# Patient Record
Sex: FEMALE | Race: WHITE | HISPANIC OR LATINO | URBAN - METROPOLITAN AREA
[De-identification: names, ages, dates, MRNs, and addresses within clinical notes are randomized per-mention and may not be internally consistent; named-entity substitution may affect disease eponyms.]

---

## 2017-06-11 ENCOUNTER — EMERGENCY (EMERGENCY)
Facility: HOSPITAL | Age: 62
LOS: 0 days | Discharge: HOME | End: 2017-06-11

## 2017-06-11 DIAGNOSIS — S52.209A UNSPECIFIED FRACTURE OF SHAFT OF UNSPECIFIED ULNA, INITIAL ENCOUNTER FOR CLOSED FRACTURE: ICD-10-CM

## 2017-06-28 DIAGNOSIS — F33.9 MAJOR DEPRESSIVE DISORDER, RECURRENT, UNSPECIFIED: ICD-10-CM

## 2017-06-28 DIAGNOSIS — F32.9 MAJOR DEPRESSIVE DISORDER, SINGLE EPISODE, UNSPECIFIED: ICD-10-CM

## 2017-06-28 DIAGNOSIS — E78.00 PURE HYPERCHOLESTEROLEMIA, UNSPECIFIED: ICD-10-CM

## 2017-06-28 DIAGNOSIS — Z79.899 OTHER LONG TERM (CURRENT) DRUG THERAPY: ICD-10-CM

## 2017-06-28 DIAGNOSIS — I10 ESSENTIAL (PRIMARY) HYPERTENSION: ICD-10-CM

## 2017-06-28 DIAGNOSIS — Z79.84 LONG TERM (CURRENT) USE OF ORAL HYPOGLYCEMIC DRUGS: ICD-10-CM

## 2017-06-28 DIAGNOSIS — Z88.1 ALLERGY STATUS TO OTHER ANTIBIOTIC AGENTS STATUS: ICD-10-CM

## 2017-06-28 DIAGNOSIS — Z87.891 PERSONAL HISTORY OF NICOTINE DEPENDENCE: ICD-10-CM

## 2017-07-03 ENCOUNTER — EMERGENCY (EMERGENCY)
Facility: HOSPITAL | Age: 62
LOS: 1 days | Discharge: HOME | End: 2017-07-03
Attending: EMERGENCY MEDICINE | Admitting: EMERGENCY MEDICINE
Payer: COMMERCIAL

## 2017-07-03 VITALS
OXYGEN SATURATION: 96 % | TEMPERATURE: 98 F | RESPIRATION RATE: 18 BRPM | HEART RATE: 95 BPM | SYSTOLIC BLOOD PRESSURE: 104 MMHG | DIASTOLIC BLOOD PRESSURE: 71 MMHG

## 2017-07-03 VITALS
TEMPERATURE: 99 F | RESPIRATION RATE: 18 BRPM | HEART RATE: 120 BPM | DIASTOLIC BLOOD PRESSURE: 90 MMHG | OXYGEN SATURATION: 98 % | SYSTOLIC BLOOD PRESSURE: 176 MMHG

## 2017-07-03 DIAGNOSIS — F32.9 MAJOR DEPRESSIVE DISORDER, SINGLE EPISODE, UNSPECIFIED: ICD-10-CM

## 2017-07-03 DIAGNOSIS — F40.01 AGORAPHOBIA WITH PANIC DISORDER: ICD-10-CM

## 2017-07-03 DIAGNOSIS — F17.210 NICOTINE DEPENDENCE, CIGARETTES, UNCOMPLICATED: ICD-10-CM

## 2017-07-03 DIAGNOSIS — Z79.899 OTHER LONG TERM (CURRENT) DRUG THERAPY: ICD-10-CM

## 2017-07-03 DIAGNOSIS — J32.9 CHRONIC SINUSITIS, UNSPECIFIED: ICD-10-CM

## 2017-07-03 LAB
ALBUMIN SERPL ELPH-MCNC: 3.9 G/DL — SIGNIFICANT CHANGE UP (ref 3.3–5)
ALP SERPL-CCNC: 166 U/L — HIGH (ref 40–120)
ALT FLD-CCNC: 33 U/L — SIGNIFICANT CHANGE UP (ref 10–45)
ANION GAP SERPL CALC-SCNC: 15 MMOL/L — SIGNIFICANT CHANGE UP (ref 5–17)
APPEARANCE UR: CLEAR — SIGNIFICANT CHANGE UP
AST SERPL-CCNC: 30 U/L — SIGNIFICANT CHANGE UP (ref 10–40)
BASOPHILS NFR BLD AUTO: 0.4 % — SIGNIFICANT CHANGE UP (ref 0–2)
BILIRUB SERPL-MCNC: 0.7 MG/DL — SIGNIFICANT CHANGE UP (ref 0.2–1.2)
BILIRUB UR-MCNC: NEGATIVE — SIGNIFICANT CHANGE UP
BUN SERPL-MCNC: 12 MG/DL — SIGNIFICANT CHANGE UP (ref 7–23)
CALCIUM SERPL-MCNC: 9.4 MG/DL — SIGNIFICANT CHANGE UP (ref 8.4–10.5)
CHLORIDE SERPL-SCNC: 102 MMOL/L — SIGNIFICANT CHANGE UP (ref 96–108)
CO2 SERPL-SCNC: 22 MMOL/L — SIGNIFICANT CHANGE UP (ref 22–31)
COLOR SPEC: YELLOW — SIGNIFICANT CHANGE UP
CREAT SERPL-MCNC: 0.7 MG/DL — SIGNIFICANT CHANGE UP (ref 0.5–1.3)
DIFF PNL FLD: NEGATIVE — SIGNIFICANT CHANGE UP
EOSINOPHIL NFR BLD AUTO: 1.3 % — SIGNIFICANT CHANGE UP (ref 0–6)
GLUCOSE SERPL-MCNC: 255 MG/DL — HIGH (ref 70–99)
GLUCOSE UR QL: 500
HCT VFR BLD CALC: 46.9 % — HIGH (ref 34.5–45)
HGB BLD-MCNC: 16.9 G/DL — HIGH (ref 11.5–15.5)
KETONES UR-MCNC: NEGATIVE — SIGNIFICANT CHANGE UP
LEUKOCYTE ESTERASE UR-ACNC: NEGATIVE — SIGNIFICANT CHANGE UP
LYMPHOCYTES # BLD AUTO: 18.1 % — SIGNIFICANT CHANGE UP (ref 13–44)
MCHC RBC-ENTMCNC: 33.5 PG — SIGNIFICANT CHANGE UP (ref 27–34)
MCHC RBC-ENTMCNC: 36 G/DL — SIGNIFICANT CHANGE UP (ref 32–36)
MCV RBC AUTO: 92.9 FL — SIGNIFICANT CHANGE UP (ref 80–100)
MONOCYTES NFR BLD AUTO: 4.6 % — SIGNIFICANT CHANGE UP (ref 2–14)
NEUTROPHILS NFR BLD AUTO: 75.6 % — SIGNIFICANT CHANGE UP (ref 43–77)
NITRITE UR-MCNC: NEGATIVE — SIGNIFICANT CHANGE UP
PH UR: 6 — SIGNIFICANT CHANGE UP (ref 5–8)
PLATELET # BLD AUTO: 155 K/UL — SIGNIFICANT CHANGE UP (ref 150–400)
POTASSIUM SERPL-MCNC: 4.1 MMOL/L — SIGNIFICANT CHANGE UP (ref 3.5–5.3)
POTASSIUM SERPL-SCNC: 4.1 MMOL/L — SIGNIFICANT CHANGE UP (ref 3.5–5.3)
PROT SERPL-MCNC: 7.1 G/DL — SIGNIFICANT CHANGE UP (ref 6–8.3)
PROT UR-MCNC: NEGATIVE MG/DL — SIGNIFICANT CHANGE UP
RBC # BLD: 5.05 M/UL — SIGNIFICANT CHANGE UP (ref 3.8–5.2)
RBC # FLD: 13.8 % — SIGNIFICANT CHANGE UP (ref 10.3–16.9)
SODIUM SERPL-SCNC: 139 MMOL/L — SIGNIFICANT CHANGE UP (ref 135–145)
SP GR SPEC: 1.02 — SIGNIFICANT CHANGE UP (ref 1–1.03)
T4 AB SER-ACNC: 7.92 UG/DL — SIGNIFICANT CHANGE UP (ref 3.17–11.72)
TSH SERPL-MCNC: 0.33 UIU/ML — LOW (ref 0.35–4.94)
UROBILINOGEN FLD QL: 1 E.U./DL — SIGNIFICANT CHANGE UP
WBC # BLD: 17.1 K/UL — HIGH (ref 3.8–10.5)
WBC # FLD AUTO: 17.1 K/UL — HIGH (ref 3.8–10.5)

## 2017-07-03 PROCEDURE — 71046 X-RAY EXAM CHEST 2 VIEWS: CPT

## 2017-07-03 PROCEDURE — 99284 EMERGENCY DEPT VISIT MOD MDM: CPT | Mod: 25

## 2017-07-03 PROCEDURE — 84443 ASSAY THYROID STIM HORMONE: CPT

## 2017-07-03 PROCEDURE — 71020: CPT | Mod: 26

## 2017-07-03 PROCEDURE — 87086 URINE CULTURE/COLONY COUNT: CPT

## 2017-07-03 PROCEDURE — 90792 PSYCH DIAG EVAL W/MED SRVCS: CPT

## 2017-07-03 PROCEDURE — 85025 COMPLETE CBC W/AUTO DIFF WBC: CPT

## 2017-07-03 PROCEDURE — 87186 SC STD MICRODIL/AGAR DIL: CPT

## 2017-07-03 PROCEDURE — 81003 URINALYSIS AUTO W/O SCOPE: CPT

## 2017-07-03 PROCEDURE — 93005 ELECTROCARDIOGRAM TRACING: CPT

## 2017-07-03 PROCEDURE — 93010 ELECTROCARDIOGRAM REPORT: CPT | Mod: NC

## 2017-07-03 PROCEDURE — 84480 ASSAY TRIIODOTHYRONINE (T3): CPT

## 2017-07-03 PROCEDURE — 84436 ASSAY OF TOTAL THYROXINE: CPT

## 2017-07-03 PROCEDURE — 80053 COMPREHEN METABOLIC PANEL: CPT

## 2017-07-03 RX ORDER — VENLAFAXINE HCL 75 MG
1 CAPSULE, EXT RELEASE 24 HR ORAL
Qty: 15 | Refills: 0
Start: 2017-07-03 | End: 2017-07-18

## 2017-07-03 RX ORDER — CLONAZEPAM 1 MG
1 TABLET ORAL
Qty: 30 | Refills: 0
Start: 2017-07-03 | End: 2017-07-18

## 2017-07-03 RX ORDER — AMOXICILLIN 250 MG/5ML
1 SUSPENSION, RECONSTITUTED, ORAL (ML) ORAL
Qty: 20 | Refills: 0
Start: 2017-07-03 | End: 2017-07-13

## 2017-07-03 NOTE — ED BEHAVIORAL HEALTH ASSESSMENT NOTE - HPI (INCLUDE ILLNESS QUALITY, SEVERITY, DURATION, TIMING, CONTEXT, MODIFYING FACTORS, ASSOCIATED SIGNS AND SYMPTOMS)
62 year old female with long history of anxiety with panic attacks, resultant depression due to the anxiety, prior successful rx with SSRI's, now walk in with unremitting anxiety despite taking prescribed lorazepam 1 mg po qid prn.   Pt acknowledges noncompliance with current rx (Cymbalta, then Paxil), mainly because she is anxious that she will have SE as she's had with prior medications (burning sensation, itching, hair loss).  Pt reports she is approaching the end of TMS treatment. She already has had 31 sessions but will not go for the remaining 3 sessions. She states that her sx have worsened since the TMS.  Pt endorsing panic attacks, tearfulness, staying in bed most of the day because she is afraid to go out, +/- concentration difficulties, decreased jorge with ~5# weight loss. She sleeps well most nights (usually 6 hours) but wakes up with tremendous anxiety.   Pt denies suicidal ideation/intent/plan now or ever, states she is very Islam and this has never crossed her mind.

## 2017-07-03 NOTE — ED BEHAVIORAL HEALTH ASSESSMENT NOTE - DESCRIPTION
Crying, anxious but redirectable and cooperative. Type II DM, HTN, cervical dystonia, GERD, lower back and cervical neck pain. Graduated from [employer]. ; 2 adult sons. Worked
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
## 2017-07-03 NOTE — ED PROVIDER NOTE - OBJECTIVE STATEMENT
62 F with hx of anxiety depression- co severe worsening depression- she is rx'ed cymbalta- not taking 2' to concern for SE- no si/hi  moderate- severe depression- very tearful  no exac factors  no allev- not improved with 31 tms rx's  has outpx therapist in [place]

## 2017-07-03 NOTE — ED ADULT NURSE NOTE - CHPI ED SYMPTOMS NEG
no paranoia/no weakness/no homicidal/no hallucinations/no suicidal/no weight loss/no confusion/no change in level of consciousness/no disorientation/no agitation

## 2017-07-03 NOTE — ED BEHAVIORAL HEALTH ASSESSMENT NOTE - OTHER PAST PSYCHIATRIC HISTORY (INCLUDE DETAILS REGARDING ONSET, COURSE OF ILLNESS, INPATIENT/OUTPATIENT TREATMENT)
As above.  No prior inpt admits.  No hx of suicidal ideation/intent/plan.    OUTPT:  Prior: Used to go to [facility] outpt psychiatric clinic ([facility]). Was rx'ed with Lexapro and Celexa with + effect. Self-d/c'ed medication when she began drinking because she was aware that there would be interactions between the alcohol and SSRI's. As above.  No prior inpt admits.  No hx of suicidal ideation/intent/plan.    OUTPT:  Prior: Used to go to [facility] ([facility] Was rx'ed with Lexapro and Celexa with + effect. Self-d/c'ed medication when she began drinking because she was aware that there would be interactions between the alcohol and SSRI's.    Current outpt tx/rx:  Sees Dr. Power ([phone]) ~q 2weeks. Next appt 7/6. Has been rx'ed with Lexapro, Cymbalta, now Paxil. Self-d/c'ed all antidepressant medications due to either SE or fear of SE. Currently, Dr. Power is only prescribing lorazepam for her.  Pt also started seeing outpt psychotherapist for CBT ("Joanna"), has only seen her 3x so far. Next appt is 7/7.

## 2017-07-03 NOTE — ED ADULT TRIAGE NOTE - CHIEF COMPLAINT QUOTE
pt c/o severe anxiety & depression. pt states " I had TSI therapy & it made me worse. They prescribed me medication but I'm too scared to take the medications because of bad side effects." pt is anxious & crying in triage. denies SI, HI.

## 2017-07-03 NOTE — ED PROVIDER NOTE - CARE PLAN
Principal Discharge DX:	Depression, unspecified depression type  Secondary Diagnosis:	Chronic sinusitis, unspecified location

## 2017-07-03 NOTE — ED BEHAVIORAL HEALTH ASSESSMENT NOTE - SUMMARY
62 year old female with hx of panic disorder, depression, noncompliant with antidepressant rx (for both panic disorder and depression) due to perceived SE. Pt with no suicidal ideation/intent/plan, thus no need for inpt admission.  Pt has never been on venlafaxine which may help both panic disorder and depression.  Pt has never been on clonazepam which may better address anxiety.

## 2017-07-03 NOTE — ED BEHAVIORAL HEALTH ASSESSMENT NOTE - RISK ASSESSMENT
Pt denies any history of suicidality, also denies any current SI/intent/plan. Is [employer]. Is future oriented. Lives with her family. Low risk for self harm at present time.

## 2017-07-03 NOTE — ED PROVIDER NOTE - MEDICAL DECISION MAKING DETAILS
seen by psych- rec med changes to klonopin nad effexor- px endorses frontal sinus pressure for weeks- req abx for sinus's- will adjust meds- fu with pcp/psych in 1 week

## 2017-07-03 NOTE — ED ADULT NURSE NOTE - OBJECTIVE STATEMENT
Pt presents to the ER tearful c/o anxiety and depression x1 year when she stopped drinking. Pt stated her father and sister "are dying from alcohol so I stopped." Pt stated she has tried many different anti depressants and have experienced "awful side effects. My arms were burning, my stomach, my whole body was itchy, I had tremors on the inside. One of them made me hyper." Pt currently takes lorazepam with minimal relief. Pt denies SI, denies HI. Pt reports she doesn't want to get out of bed and doesn't interact with her children, only over the phone. Will monitor and maintain safety.

## 2017-07-03 NOTE — ED BEHAVIORAL HEALTH ASSESSMENT NOTE - MEDICATIONS (PRESCRIPTIONS, DIRECTIONS)
Replace lorazepam with longer-acting clonazepam, to reduce periods of withdrawal and provide more smooth anxiolysis. Trial of venlafaxine SR 37.5 mg po qdaily, to address both anxiety and panic attacks as well as depressed mood.

## 2017-07-03 NOTE — ED BEHAVIORAL HEALTH ASSESSMENT NOTE - SUICIDE PROTECTIVE FACTORS
Responsibility to family and others/High spirituality/Identifies reasons for living/Future oriented/Supportive social network or family

## 2017-07-04 LAB — T3 SERPL-MCNC: 170 NG/DL — SIGNIFICANT CHANGE UP (ref 80–200)

## 2017-07-05 ENCOUNTER — EMERGENCY (EMERGENCY)
Facility: HOSPITAL | Age: 62
LOS: 1 days | Discharge: HOME | End: 2017-07-05
Attending: EMERGENCY MEDICINE | Admitting: EMERGENCY MEDICINE
Payer: COMMERCIAL

## 2017-07-05 VITALS
OXYGEN SATURATION: 100 % | RESPIRATION RATE: 18 BRPM | SYSTOLIC BLOOD PRESSURE: 115 MMHG | HEART RATE: 92 BPM | DIASTOLIC BLOOD PRESSURE: 80 MMHG | TEMPERATURE: 98 F

## 2017-07-05 VITALS
RESPIRATION RATE: 20 BRPM | SYSTOLIC BLOOD PRESSURE: 159 MMHG | HEART RATE: 100 BPM | DIASTOLIC BLOOD PRESSURE: 96 MMHG | OXYGEN SATURATION: 95 % | WEIGHT: 164.02 LBS | TEMPERATURE: 99 F

## 2017-07-05 DIAGNOSIS — F41.9 ANXIETY DISORDER, UNSPECIFIED: ICD-10-CM

## 2017-07-05 DIAGNOSIS — F17.210 NICOTINE DEPENDENCE, CIGARETTES, UNCOMPLICATED: ICD-10-CM

## 2017-07-05 DIAGNOSIS — Z79.2 LONG TERM (CURRENT) USE OF ANTIBIOTICS: ICD-10-CM

## 2017-07-05 DIAGNOSIS — F41.1 GENERALIZED ANXIETY DISORDER: ICD-10-CM

## 2017-07-05 DIAGNOSIS — Z79.899 OTHER LONG TERM (CURRENT) DRUG THERAPY: ICD-10-CM

## 2017-07-05 LAB
-  AMPICILLIN/SULBACTAM: SIGNIFICANT CHANGE UP
-  AMPICILLIN: SIGNIFICANT CHANGE UP
-  CEFAZOLIN: SIGNIFICANT CHANGE UP
-  CEFTRIAXONE: SIGNIFICANT CHANGE UP
-  CIPROFLOXACIN: SIGNIFICANT CHANGE UP
-  GENTAMICIN: SIGNIFICANT CHANGE UP
-  NITROFURANTOIN: SIGNIFICANT CHANGE UP
-  PIPERACILLIN/TAZOBACTAM: SIGNIFICANT CHANGE UP
-  TOBRAMYCIN: SIGNIFICANT CHANGE UP
-  TRIMETHOPRIM/SULFAMETHOXAZOLE: SIGNIFICANT CHANGE UP
CULTURE RESULTS: SIGNIFICANT CHANGE UP
METHOD TYPE: SIGNIFICANT CHANGE UP
ORGANISM # SPEC MICROSCOPIC CNT: SIGNIFICANT CHANGE UP
ORGANISM # SPEC MICROSCOPIC CNT: SIGNIFICANT CHANGE UP
SPECIMEN SOURCE: SIGNIFICANT CHANGE UP

## 2017-07-05 PROCEDURE — 90792 PSYCH DIAG EVAL W/MED SRVCS: CPT

## 2017-07-05 PROCEDURE — 99284 EMERGENCY DEPT VISIT MOD MDM: CPT | Mod: 25

## 2017-07-05 PROCEDURE — 99284 EMERGENCY DEPT VISIT MOD MDM: CPT

## 2017-07-05 NOTE — ED PROVIDER NOTE - OBJECTIVE STATEMENT
Pt presents to ER b/c she was Rxd Effexor 2d ago for her depression/anxiety/panic attacks, however she is "too anxious" to take the medication alone at home b/c of potential side effects. Pt requesting a higher level of care / higher level of observation. No SI or HI. Lives at home w/ who is w/pt in the ED now.

## 2017-07-05 NOTE — ED BEHAVIORAL HEALTH ASSESSMENT NOTE - SUMMARY
61 y/o woman with what appears to have been ANGELINA untreated over the last year.   pt denies any risk to self or others, suitable for out pt treatment   contacts re [facility] in [place] give to pt

## 2017-07-05 NOTE — ED PROVIDER NOTE - PHYSICAL EXAMINATION
General: anxious, tearful woman in no apparent distress  Neuro: normal speech and gait  Ext: clubbing of fingers  Psych: tearful, AOx3, no SI or HI

## 2017-07-05 NOTE — ED PROVIDER NOTE - PROGRESS NOTE DETAILS
Pt came to me after receiving the list of resources from Psychiatry and told me that she would follow-up as directed and that she had to leave and would not be waiting for any discharge paperwork.

## 2017-07-05 NOTE — ED BEHAVIORAL HEALTH ASSESSMENT NOTE - DESCRIPTION
uneventful Type II DM, HTN, cervical dystonia, GERD, lower back and cervical neck pain. Graduated from [employer]. ; 2 adult sons. Worked

## 2017-07-05 NOTE — ED PROVIDER NOTE - MEDICAL DECISION MAKING DETAILS
pt given out-pt options by psychiatry in the ED per pt request pt given out-pt options by psychiatry in the ED per pt request above

## 2017-07-05 NOTE — ED BEHAVIORAL HEALTH ASSESSMENT NOTE - DESCRIPTION (FIRST USE, LAST USE, QUANTITY, FREQUENCY, DURATION)
Smokes 1 ppd Drank episodically x 3-4 years, daily for a year. Stopped ~1 year ago Only takes prescribed lorazepam.

## 2017-07-05 NOTE — ED PROVIDER NOTE - NOTES
Pt seen and evaluated in ED and given resources close to her home in [place] per pt request and pt agrees to follow-up as directed and return to ER for any other concerns.
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
## 2017-07-05 NOTE — ED ADULT NURSE NOTE - CHPI ED SYMPTOMS NEG
no homicidal/no paranoia/no agitation/no change in level of consciousness/no hallucinations/no suicidal/no weakness/no disorientation/no confusion/no weight loss

## 2017-07-05 NOTE — ED BEHAVIORAL HEALTH ASSESSMENT NOTE - OTHER PAST PSYCHIATRIC HISTORY (INCLUDE DETAILS REGARDING ONSET, COURSE OF ILLNESS, INPATIENT/OUTPATIENT TREATMENT)
As above.  No prior inpt admits.  No hx of suicidal ideation/intent/plan.    OUTPT:  Prior: Used to go to [facility]pt psychiatric clinic ([facility] Was rx'ed with Lexapro and Celexa with + effect. Self-d/c'ed medication when she began drinking because she was aware that there would be interactions between the alcohol and SSRI's.    Current outpt tx/rx:  Sees Dr. Power ([phone]) ~q 2weeks. Next appt 7/6. Has been rx'ed with Lexapro, Cymbalta, now Paxil. Self-d/c'ed all antidepressant medications due to either SE or fear of SE. Currently, Dr. Power is only prescribing lorazepam for her.  Pt also started seeing outpt psychotherapist for CBT ("Joanna"), has only seen her 3x so far. Next appt is 7/7.

## 2017-07-05 NOTE — ED ADULT NURSE NOTE - OBJECTIVE STATEMENT
63 y/o female c/o anxiety. pt sees psychiatrist outpatient who wants to put her on effexor but she reports she is scared she will have 63 y/o female c/o anxiety. pt sees psychiatrist outpatient who wants to put her on effexor but she reports she is scared that she will have a reaction to the medication. denies si/hi. 61 y/o female c/o anxiety. pt sees psychiatrist outpatient who wants to put her on effexor but she reports she is scared that she will have a reaction to the medication. pt tearful and reports feeling very depressed and cannot get up out of bed, however denies si/hi.
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
## 2017-07-05 NOTE — ED PROVIDER NOTE - CHPI ED SYMPTOMS NEG
no disorientation/no confusion/no homicidal/no change in level of consciousness/no suicidal/no hallucinations

## 2017-07-05 NOTE — ED BEHAVIORAL HEALTH ASSESSMENT NOTE - SUICIDE PROTECTIVE FACTORS
Responsibility to family and others/Future oriented/Identifies reasons for living/High spirituality/Supportive social network or family

## 2017-10-27 NOTE — ED ADULT TRIAGE NOTE - ARRIVAL INFO ADDITIONAL COMMENTS
Form is in the folder.   Denies wanting to hurt self or others, auditory or visual hallucinations, use of ETOH.

## 2018-10-22 ENCOUNTER — INPATIENT (INPATIENT)
Facility: HOSPITAL | Age: 63
LOS: 0 days | Discharge: HOME | End: 2018-10-23
Attending: INTERNAL MEDICINE | Admitting: INTERNAL MEDICINE

## 2018-10-22 VITALS
TEMPERATURE: 98 F | HEART RATE: 107 BPM | DIASTOLIC BLOOD PRESSURE: 91 MMHG | SYSTOLIC BLOOD PRESSURE: 196 MMHG | RESPIRATION RATE: 18 BRPM | OXYGEN SATURATION: 99 %

## 2018-10-22 LAB
ALBUMIN SERPL ELPH-MCNC: 3.7 G/DL — SIGNIFICANT CHANGE UP (ref 3.5–5.2)
ALP SERPL-CCNC: 214 U/L — HIGH (ref 30–115)
ALT FLD-CCNC: 44 U/L — HIGH (ref 0–41)
ANION GAP SERPL CALC-SCNC: 18 MMOL/L — HIGH (ref 7–14)
APPEARANCE UR: ABNORMAL
AST SERPL-CCNC: 60 U/L — HIGH (ref 0–41)
BASOPHILS # BLD AUTO: 0.07 K/UL — SIGNIFICANT CHANGE UP (ref 0–0.2)
BASOPHILS NFR BLD AUTO: 0.6 % — SIGNIFICANT CHANGE UP (ref 0–1)
BILIRUB SERPL-MCNC: 2.1 MG/DL — HIGH (ref 0.2–1.2)
BILIRUB UR-MCNC: NEGATIVE — SIGNIFICANT CHANGE UP
BUN SERPL-MCNC: 8 MG/DL — LOW (ref 10–20)
CALCIUM SERPL-MCNC: 9.2 MG/DL — SIGNIFICANT CHANGE UP (ref 8.5–10.1)
CHLORIDE SERPL-SCNC: 96 MMOL/L — LOW (ref 98–110)
CO2 SERPL-SCNC: 26 MMOL/L — SIGNIFICANT CHANGE UP (ref 17–32)
COLOR SPEC: YELLOW — SIGNIFICANT CHANGE UP
CREAT SERPL-MCNC: 0.6 MG/DL — LOW (ref 0.7–1.5)
DIFF PNL FLD: NEGATIVE — SIGNIFICANT CHANGE UP
EOSINOPHIL # BLD AUTO: 0.38 K/UL — SIGNIFICANT CHANGE UP (ref 0–0.7)
EOSINOPHIL NFR BLD AUTO: 3.3 % — SIGNIFICANT CHANGE UP (ref 0–8)
EPI CELLS # UR: ABNORMAL /HPF
GLUCOSE SERPL-MCNC: 133 MG/DL — HIGH (ref 70–99)
GLUCOSE UR QL: NEGATIVE MG/DL — SIGNIFICANT CHANGE UP
HCT VFR BLD CALC: 45.3 % — SIGNIFICANT CHANGE UP (ref 37–47)
HGB BLD-MCNC: 15.6 G/DL — SIGNIFICANT CHANGE UP (ref 12–16)
IMM GRANULOCYTES NFR BLD AUTO: 0.3 % — SIGNIFICANT CHANGE UP (ref 0.1–0.3)
KETONES UR-MCNC: NEGATIVE — SIGNIFICANT CHANGE UP
LEUKOCYTE ESTERASE UR-ACNC: NEGATIVE — SIGNIFICANT CHANGE UP
LIDOCAIN IGE QN: 41 U/L — SIGNIFICANT CHANGE UP (ref 7–60)
LYMPHOCYTES # BLD AUTO: 25.8 % — SIGNIFICANT CHANGE UP (ref 20.5–51.1)
LYMPHOCYTES # BLD AUTO: 3 K/UL — SIGNIFICANT CHANGE UP (ref 1.2–3.4)
MCHC RBC-ENTMCNC: 32.8 PG — HIGH (ref 27–31)
MCHC RBC-ENTMCNC: 34.4 G/DL — SIGNIFICANT CHANGE UP (ref 32–37)
MCV RBC AUTO: 95.2 FL — SIGNIFICANT CHANGE UP (ref 81–99)
MONOCYTES # BLD AUTO: 0.77 K/UL — HIGH (ref 0.1–0.6)
MONOCYTES NFR BLD AUTO: 6.6 % — SIGNIFICANT CHANGE UP (ref 1.7–9.3)
NEUTROPHILS # BLD AUTO: 7.37 K/UL — HIGH (ref 1.4–6.5)
NEUTROPHILS NFR BLD AUTO: 63.4 % — SIGNIFICANT CHANGE UP (ref 42.2–75.2)
NITRITE UR-MCNC: NEGATIVE — SIGNIFICANT CHANGE UP
NRBC # BLD: 0 /100 WBCS — SIGNIFICANT CHANGE UP (ref 0–0)
PH UR: 7.5 — SIGNIFICANT CHANGE UP (ref 5–8)
PLATELET # BLD AUTO: 97 K/UL — LOW (ref 130–400)
POTASSIUM SERPL-MCNC: 3.3 MMOL/L — LOW (ref 3.5–5)
POTASSIUM SERPL-SCNC: 3.3 MMOL/L — LOW (ref 3.5–5)
PROT SERPL-MCNC: 7.2 G/DL — SIGNIFICANT CHANGE UP (ref 6–8)
PROT UR-MCNC: NEGATIVE MG/DL — SIGNIFICANT CHANGE UP
RBC # BLD: 4.76 M/UL — SIGNIFICANT CHANGE UP (ref 4.2–5.4)
RBC # FLD: 14.4 % — SIGNIFICANT CHANGE UP (ref 11.5–14.5)
SODIUM SERPL-SCNC: 140 MMOL/L — SIGNIFICANT CHANGE UP (ref 135–146)
SP GR SPEC: 1.01 — SIGNIFICANT CHANGE UP (ref 1.01–1.03)
TROPONIN T SERPL-MCNC: <0.01 NG/ML — SIGNIFICANT CHANGE UP
UROBILINOGEN FLD QL: 2 MG/DL (ref 0.2–0.2)
WBC # BLD: 11.63 K/UL — HIGH (ref 4.8–10.8)
WBC # FLD AUTO: 11.63 K/UL — HIGH (ref 4.8–10.8)

## 2018-10-22 RX ORDER — SODIUM CHLORIDE 9 MG/ML
1000 INJECTION, SOLUTION INTRAVENOUS ONCE
Qty: 0 | Refills: 0 | Status: COMPLETED | OUTPATIENT
Start: 2018-10-22 | End: 2018-10-22

## 2018-10-22 RX ORDER — FAMOTIDINE 10 MG/ML
20 INJECTION INTRAVENOUS ONCE
Qty: 0 | Refills: 0 | Status: COMPLETED | OUTPATIENT
Start: 2018-10-22 | End: 2018-10-22

## 2018-10-22 RX ADMIN — FAMOTIDINE 20 MILLIGRAM(S): 10 INJECTION INTRAVENOUS at 20:22

## 2018-10-22 RX ADMIN — SODIUM CHLORIDE 1000 MILLILITER(S): 9 INJECTION, SOLUTION INTRAVENOUS at 21:22

## 2018-10-22 RX ADMIN — SODIUM CHLORIDE 1000 MILLILITER(S): 9 INJECTION, SOLUTION INTRAVENOUS at 20:22

## 2018-10-22 RX ADMIN — Medication 30 MILLILITER(S): at 20:22

## 2018-10-22 NOTE — ED PROVIDER NOTE - CARE PLAN
Assessment and plan of treatment:	r/o rib fx, ptx, r/o intraabdo pathology- cholycystitis/choledocolithiasis, pancreatitis, possible gatritis/gerd, assess for uti, renal mass/hematoma  belly labs, ct chest, ctap, fluids, gi cocktail, ed observation Principal Discharge DX:	Closed fracture of one rib of left side, initial encounter  Assessment and plan of treatment:	r/o rib fx, ptx, r/o intraabdo pathology- cholycystitis/choledocolithiasis, pancreatitis, possible gatritis/gerd, assess for uti, renal mass/hematoma  belly labs, ct chest, ctap, fluids, gi cocktail, ed observation  Secondary Diagnosis:	Gall stones  Secondary Diagnosis:	Elevated LFTs

## 2018-10-22 NOTE — ED PROVIDER NOTE - MEDICAL DECISION MAKING DETAILS
seen by surgery in ER. No tenderness on their exam but given elevated LFTs and sono findings recommend med admit with GI eval, Sugery to remain on consult and pt to be monitored

## 2018-10-22 NOTE — ED PROVIDER NOTE - PHYSICAL EXAMINATION
PHYSICAL EXAM:    Constitutional: awake, alert, NAD  Eyes: EOMI, no conj injection  HENT: NC AT  Back: no c/t/l spine ttp  Chest: minimal left lower rib ttp  Respiratory: no respiratory distress, breath sounds equal b/l, no wheezing, rhonchi or stridor.   Cardiovascular: RRR nml S1S2  Gastrointestinal: soft, no masses, epigastric/ruq tenderness. nondistended.  no cvat  Extremities: no peripheral edema  Neurological: AAOx3, CN II-XII grossly intact, no focal numbness or weakness  Skin: no rash  Musculoskeletal: no gross deformity

## 2018-10-22 NOTE — ED PROVIDER NOTE - PLAN OF CARE
r/o rib fx, ptx, r/o intraabdo pathology- cholycystitis/choledocolithiasis, pancreatitis, possible gatritis/gerd, assess for uti, renal mass/hematoma  belly labs, ct chest, ctap, fluids, gi cocktail, ed observation

## 2018-10-22 NOTE — ED ADULT TRIAGE NOTE - CHIEF COMPLAINT QUOTE
pt fell backwards on steps x 2 weeks ago, no LOC, not on blood thinners. had chest x ray which was unremarkable. pt still c/o rib pain, abdominal bloating and hematuria today. pt denies hitting her stomach when she fell

## 2018-10-22 NOTE — ED ADULT NURSE NOTE - OBJECTIVE STATEMENT
Pt c/o left sided RIB pain s/p fall x 2 weeks ago on her back. Pt also c/o epigastric pain and RUQ pain, states the pain was there before the fall and has been progressively worse.

## 2018-10-22 NOTE — ED PROVIDER NOTE - PROGRESS NOTE DETAILS
pt turned over to Dr. Gallegos- khoa 2 weeks ago w/ persistent rib pain. also epigastric/ruq pain/tenderness today. CT showing cirrhosis w/ perihepatic fluid, pending sono to r/o cholecystitis/choledocolithiasis. endorsed to me by dr alexander, pending US of RUQ and displ questionable cholecystitis- surgery consulted. case discussed called surgery again as pt pending eval. They will see her next spoke to surgery, don't believe acute cholecystitis as pt not tender on their exam, but given fluid + elevated liver function tests wants admission to medicine and they will follow as consult. Spoke to PMJADYN Steen, admit to his service, consult Iris for GI.

## 2018-10-22 NOTE — ED PROVIDER NOTE - OBJECTIVE STATEMENT
64 yo f hx copd, dm, htn, hld, hx gallstones, here for multiple complaints. 2 weeks ago pt had mech fall and hit her back. pt c/o L rib pain which initially began on her back and now her left semithorax. no sob. no exertional cp.  pt also c/o worsening epigastric/ruq pain w/ nausea. no vomiting or diarrhea. stooling normally. no fever or chills. pt c/f rib fx. pt endorsing 1 day hematuria w/ frequency and foul smell to urine.

## 2018-10-22 NOTE — ED ADULT NURSE NOTE - NSIMPLEMENTINTERV_GEN_ALL_ED
Implemented All Fall Risk Interventions:  [place] to call system. Call bell, personal items and telephone within reach. Instruct patient to call for assistance. Room bathroom lighting operational. Non-slip footwear when patient is off stretcher. Physically safe environment: no spills, clutter or unnecessary equipment. Stretcher in lowest position, wheels locked, appropriate side rails in place. Provide visual cue, wrist band, yellow gown, etc. Monitor gait and stability. Monitor for mental status changes and reorient to person, place, and time. Review medications for side effects contributing to fall risk. Reinforce activity limits and safety measures with patient and family.

## 2018-10-22 NOTE — ED PROVIDER NOTE - NS ED ROS FT
Constutional: no fever or rigors  Eyes: no eye redness, acute visual change  ENMT: no ear pain, no throat pain  Card: pos chest wall pain, no palpitations  Pulm: no cough, no shortness of breath  GI: pos abdominal pain, nausea    :  pos hematuria, frequency  MSK: no limitation in range of motion, no neck pain  Skin: no rash, no abrasion  Neuro: no numbness, no weakness  Heme/Onc: no easy bruising, no bleeding tendency   Allergic: no hives, no throat swelling

## 2018-10-23 ENCOUNTER — TRANSCRIPTION ENCOUNTER (OUTPATIENT)
Age: 63
End: 2018-10-23

## 2018-10-23 VITALS
SYSTOLIC BLOOD PRESSURE: 146 MMHG | OXYGEN SATURATION: 97 % | DIASTOLIC BLOOD PRESSURE: 82 MMHG | HEART RATE: 87 BPM | TEMPERATURE: 98 F | RESPIRATION RATE: 18 BRPM

## 2018-10-23 LAB — BILIRUB DIRECT SERPL-MCNC: 0.9 MG/DL — HIGH (ref 0–0.2)

## 2018-10-23 RX ORDER — PANTOPRAZOLE SODIUM 20 MG/1
40 TABLET, DELAYED RELEASE ORAL ONCE
Qty: 0 | Refills: 0 | Status: COMPLETED | OUTPATIENT
Start: 2018-10-23 | End: 2018-10-23

## 2018-10-23 RX ORDER — ESOMEPRAZOLE MAGNESIUM 40 MG/1
1 CAPSULE, DELAYED RELEASE ORAL
Qty: 30 | Refills: 0
Start: 2018-10-23

## 2018-10-23 RX ORDER — POTASSIUM CHLORIDE 20 MEQ
40 PACKET (EA) ORAL ONCE
Qty: 0 | Refills: 0 | Status: DISCONTINUED | OUTPATIENT
Start: 2018-10-23 | End: 2018-10-23

## 2018-10-23 RX ORDER — OXYCODONE HYDROCHLORIDE 5 MG/1
1 TABLET ORAL
Qty: 12 | Refills: 0
Start: 2018-10-23 | End: 2018-10-25

## 2018-10-23 RX ADMIN — Medication 40 MILLIEQUIVALENT(S): at 02:49

## 2018-10-23 NOTE — PROGRESS NOTE ADULT - SUBJECTIVE AND OBJECTIVE BOX
Admit note    Patient was seen and examined. Spoke with RN. Chart reviewed.  No events overnight.    Vital Signs Last 24 Hrs  T(F): 98.4 (23 Oct 2018 01:54), Max: 98.4 (23 Oct 2018 01:54)  HR: 96 (23 Oct 2018 01:54) (96 - 107)  BP: 147/65 (23 Oct 2018 01:54) (147/65 - 196/91)  SpO2: 96% (23 Oct 2018 01:54) (96% - 99%)  MEDICATIONS  (STANDING):  potassium chloride   Solution 40 milliEquivalent(s) Oral Once    MEDICATIONS  (PRN):    Labs:                        15.6   11.63 )-----------( 97       ( 22 Oct 2018 20:20 )             45.3     22 Oct 2018 20:20    140    |  96     |  8      ----------------------------<  133    3.3     |  26     |  0.6      Ca    9.2        22 Oct 2018 20:20    TPro  x      /  Alb  x      /  TBili  x      /  DBili  0.9    /  AST  x      /  ALT  x      /  AlkPhos  x      23 Oct 2018 02:28  TPro  7.2    /  Alb  3.7    /  TBili  2.1    /  DBili  x      /  AST  60     /  ALT  44     /  AlkPhos  214    22 Oct 2018 20:20      Urinalysis Basic - ( 22 Oct 2018 20:45 )    Color: Yellow / Appearance: Cloudy / S.015 / pH: x  Gluc: x / Ketone: Negative  / Bili: Negative / Urobili: 2.0 mg/dL   Blood: x / Protein: Negative mg/dL / Nitrite: Negative   Leuk Esterase: Negative / RBC: x / WBC x   Sq Epi: x / Non Sq Epi: Few /HPF / Bacteria: x        General: comfortable, NAD  Neurology: A&Ox3, nonfocal  Head:  Normocephalic, atraumatic  ENT:  Mucosa moist, no ulcerations  Neck:  Supple, no JVD,   Skin: no breakdowns (as per RN)  Resp: CTA B/L; tenderness left side  CV: RRR, S1S2,   GI: Soft, NT, bowel sounds  MS: No edema, + peripheral pulses, FROM all 4 extremity      A/P:  64yo woman with left side pain- found to have rib fx after fall    Now stable; O2 sat 95 on RA    no dyspnea    incentive spirometry    analgesia prn    DC today    outpt GI f/u for incidental cholelethiasis      DVT prophylaxis  Decubitus prevention- all measures as per RN protocol  Please call or text me with any questions or updates

## 2018-10-23 NOTE — DISCHARGE NOTE ADULT - CARE PLAN
Principal Discharge DX:	Closed fracture of one rib of left side, initial encounter  Goal:	Pain control as needed  Assessment and plan of treatment:	Pain control  Secondary Diagnosis:	Liver cirrhosis  Goal:	FU with GI

## 2018-10-23 NOTE — DISCHARGE NOTE ADULT - CARE PROVIDER_API CALL
Ny Monreal), Gastroenterology  [address] [place] [address]  Phone: [phone]  Fax: [phone]    Kieran Simmons), Gastroenterology  [address] [place] [address]  Phone: [phone]  Fax: [phone]

## 2018-10-23 NOTE — H&P ADULT - PMH
COPD (chronic obstructive pulmonary disease)    DM (diabetes mellitus)    Gall stones    HLD (hyperlipidemia)    HTN (hypertension)    Liver cirrhosis

## 2018-10-23 NOTE — DISCHARGE NOTE ADULT - MEDICATION SUMMARY - MEDICATIONS TO TAKE
I will START or STAY ON the medications listed below when I get home from the hospital:    oxyCODONE 5 mg oral tablet  -- 1 tab(s) by mouth 4 times a day, As Needed -for severe pain MDD:4 tabs  -- Caution federal law prohibits the transfer of this drug to any person other  than the person for whom it was prescribed.  It is very important that you take or use this exactly as directed.  Do not skip doses or discontinue unless directed by your doctor.  May cause drowsiness.  Alcohol may intensify this effect.  Use care when operating dangerous machinery.  This prescription cannot be refilled.  Using more of this medication than prescribed may cause serious breathing problems.    -- Indication: For Closed fracture of one rib of left side, initial encounter    KlonoPIN 0.5 mg oral tablet  -- 1 tab(s) by mouth once a day  -- Indication: For anxiety    Paxil 20 mg oral tablet  -- 1 tab(s) by mouth once a day  -- Indication: For anxiety    metFORMIN 500 mg oral tablet  -- 1 tab(s) by mouth 2 times a day  -- Indication: For Diabets    Lipitor 40 mg oral tablet  -- 1 tab(s) by mouth once a day  -- Indication: For Dyslipidemia    esomeprazole 40 mg oral delayed release capsule  -- 1 cap(s) by mouth once a day   -- It is very important that you take or use this exactly as directed.  Do not skip doses or discontinue unless directed by your doctor.  Obtain medical advice before taking any non-prescription drugs as some may affect the action of this medication.  Swallow whole.  Do not crush.  Take medication on an empty stomach 1 hour before or 2 to 3 hours after a meal unless otherwise directed by your doctor.    -- Indication: For reflux

## 2018-10-23 NOTE — DISCHARGE NOTE ADULT - HOSPITAL COURSE
62 yo lady with PMH listed above presenting for left sided chest pain 2/2 to mechanical fall    1)Left sided chest pain from traumatic fall     Found to have L 5th rib fracture    Negative ECG and cardiac enzymes     Will D/C on Oxycodone     2)Increased LFT's     Evidence of cholelithiasis and cirrhosis and US abdomen     Patient is aware of that     Currently no abdominal pain , and no RUQ tenderness     Evaluated by Surgery , no intervention recommended     Findings are incidental     Patient to F/U with GI as outpatient

## 2018-10-23 NOTE — CONSULT NOTE ADULT - ASSESSMENT
63YR/FEMALE WITH cholelithiasis/left anterior 5th rib fracture, pulling approx 1500 ml on incentive spirometer.  Plan:  1.trend LFts   2.GI consult

## 2018-10-23 NOTE — H&P ADULT - NSHPLABSRESULTS_GEN_ALL_CORE
15.6   11.63 )-----------( 97       ( 22 Oct 2018 20:20 )             45.3       10-    140  |  96<L>  |  8<L>  ----------------------------<  133<H>  3.3<L>   |  26  |  0.6<L>    Ca    9.2      22 Oct 2018 20:20    TPro  x   /  Alb  x   /  TBili  x   /  DBili  0.9<H>  /  AST  x   /  ALT  x   /  AlkPhos  x   10-23              Urinalysis Basic - ( 22 Oct 2018 20:45 )    Color: Yellow / Appearance: Cloudy / S.015 / pH: x  Gluc: x / Ketone: Negative  / Bili: Negative / Urobili: 2.0 mg/dL   Blood: x / Protein: Negative mg/dL / Nitrite: Negative   Leuk Esterase: Negative / RBC: x / WBC x   Sq Epi: x / Non Sq Epi: Few /HPF / Bacteria: x            Lactate Trend      CARDIAC MARKERS ( 22 Oct 2018 20:20 )  x     / <0.01 ng/mL / x     / x     / x

## 2018-10-23 NOTE — H&P ADULT - ASSESSMENT
62 yo lady with PMH listed above presenting for left sided chest pain 2/2 to mechanical fall    1)Left sided chest pain from traumatic fall     Negative ECG and cardiac enzymes     Will D/C on Oxycodone     2)Increased LFT's     Evidence of cholelithiasis and cirrhosis and US abdomen     Patient is aware of that     Currently no abdominal pain , and no RUQ tenderness     Evaluated by Surgery , no intervention recommended     Findings are incidental     Patient to F/U with GI as outpatient

## 2018-10-23 NOTE — CONSULT NOTE ADULT - SUBJECTIVE AND OBJECTIVE BOX
HE BRIZUELA [id number]  63y Female    HPI:    63 y/ female with history of copd/ htn/ dm/gallstones came to the ED with multiple complaints of left sided lower chest pain,  2 weeks ago pt had a fall while she was carrying her laundry and climbing stairs, skipped a step and fell on her back, she complains of left rib pain which initially began on her back. Patient has no complains of pain in the epigastrium, right upper quadrant pain, no nausea/vomitting/no altered bowel movement.    PAST MEDICAL & SURGICAL HISTORY:  Gall stones  HLD (hyperlipidemia)  HTN (hypertension)  DM (diabetes mellitus)  COPD (chronic obstructive pulmonary disease)  No significant past surgical history        MEDICATIONS  (STANDING):  potassium chloride   Solution 40 milliEquivalent(s) Oral Once    MEDICATIONS  (PRN):      Allergies    No Known Allergies    Intolerances        REVIEW OF SYSTEMS    [x ] A ten-point review of systems was otherwise negative except as noted.  [ ] Due to altered mental status/intubation, subjective information were not able to be obtained from the patient. History was obtained, to the extent possible, from review of the chart and collateral sources of information.      Vital Signs Last 24 Hrs  T(C): 36.9 (23 Oct 2018 01:54), Max: 36.9 (23 Oct 2018 01:54)  T(F): 98.4 (23 Oct 2018 01:54), Max: 98.4 (23 Oct 2018 01:54)  HR: 96 (23 Oct 2018 01:54) (96 - 107)  BP: 147/65 (23 Oct 2018 01:54) (147/65 - 196/91)  BP(mean): --  RR: 18 (23 Oct 2018 01:54) (18 - 18)  SpO2: 96% (23 Oct 2018 01:54) (96% - 99%)    PHYSICAL EXAM:  GENERAL: NAD, well-appearing  CHEST/LUNG: Clear to auscultation bilaterally  HEART: Regular rate and rhythm  ABDOMEN: Soft, Nontender, Nondistended;   EXTREMITIES:  No clubbing, cyanosis, or edema      LABS:  Labs:  CAPILLARY BLOOD GLUCOSE                              15.6   11.63 )-----------( 97       ( 22 Oct 2018 20:20 )             45.3       Auto Neutrophil %: 63.4 % (10-22-18 @ 20:20)  Auto Immature Granulocyte %: 0.3 % (10-22-18 @ 20:20)    10-22    140  |  96<L>  |  8<L>  ----------------------------<  133<H>  3.3<L>   |  26  |  0.6<L>      Calcium, Total Serum: 9.2 mg/dL (10-22-18 @ 20:20)      LFTs:             x    | x    | x        ------------------[x       ( 23 Oct 2018 02:28 )  x    | 0.9  | x           Lipase:x      Amylase:x             Coags:    CARDIAC MARKERS ( 22 Oct 2018 20:20 )  x     / <0.01 ng/mL / x     / x     / x          Urinalysis Basic - ( 22 Oct 2018 20:45 )    Color: Yellow / Appearance: Cloudy / S.015 / pH: x  Gluc: x / Ketone: Negative  / Bili: Negative / Urobili: 2.0 mg/dL   Blood: x / Protein: Negative mg/dL / Nitrite: Negative   Leuk Esterase: Negative / RBC: x / WBC x   Sq Epi: x / Non Sq Epi: Few /HPF / Bacteria: x            RADIOLOGY & ADDITIONAL STUDIES:  	  CT SCAN:    Nondisplaced left anterior fifth rib fracture.   Cirrhotic appearance of the liver    Lung findings are suspicious for fibrotic changes. No evidence of   effusion or pneumothorax    Cholelithiasis with nonspecific pericholecystic fluid. Right upper   quadrant ultrasound is scheduled.    Punctate right-sided nonobstructing renal calculi    USG ABD AND PELVIS      LIVER: Liver is heterogeneous and nodular in contour compatible with   cirrhotic changes. No focal mass.    GALLBLADDER/BILIARY TREE:  Cholelithiasis. Nonspecific gallbladder wall   thickening to 5 mm. There is minimal amount of nonspecific   pericholecystic fluid.  Negative sonographic Hernandez's sign. No   intrahepatic biliary ductal dilatation. The common bile duct measures 5   mm, which is normal.

## 2018-10-23 NOTE — DISCHARGE NOTE ADULT - PATIENT PORTAL LINK FT
You can access the [facility] Patient Portal, offered by [facility], by registering with the following website: [email or web address]

## 2018-10-23 NOTE — H&P ADULT - NSHPPHYSICALEXAM_GEN_ALL_CORE
T(C): 36 (10-23-18 @ 07:08), Max: 36.9 (10-23-18 @ 01:54)  HR: 89 (10-23-18 @ 07:08) (89 - 107)  BP: 153/69 (10-23-18 @ 07:08) (147/65 - 196/91)  RR: 18 (10-23-18 @ 07:08) (18 - 18)  SpO2: 95% (10-23-18 @ 07:08) (95% - 99%)    PHYSICAL EXAM:  GENERAL: NAD, well-developed  NECK: Supple, No JVD  CHEST/LUNG: Clear to auscultation bilaterally; No wheeze  HEART: Regular rate and rhythm; No murmurs, rubs, or gallops  ABDOMEN: Soft, Nontender, Nondistended; Bowel sounds present , no RUQ tenderness   EXTREMITIES:  2+ Peripheral Pulses, No clubbing, cyanosis, or edema

## 2018-10-23 NOTE — H&P ADULT - HISTORY OF PRESENT ILLNESS
64yo lady with PMH of DM , COPD, DLD , HTN, cholelithiasis and cirrhosis presenting for the above CC . Patient had sustained a fall 2 weeks ago , and since then she is having left sided chest pain , that was not improving with Molt rosita . Denies any SOB or palpitations

## 2018-10-30 DIAGNOSIS — S22.32XA FRACTURE OF ONE RIB, LEFT SIDE, INITIAL ENCOUNTER FOR CLOSED FRACTURE: ICD-10-CM

## 2018-10-30 DIAGNOSIS — Z87.891 PERSONAL HISTORY OF NICOTINE DEPENDENCE: ICD-10-CM

## 2018-10-30 DIAGNOSIS — I10 ESSENTIAL (PRIMARY) HYPERTENSION: ICD-10-CM
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
## 2018-10-30 DIAGNOSIS — J44.9 CHRONIC OBSTRUCTIVE PULMONARY DISEASE, UNSPECIFIED: ICD-10-CM

## 2018-10-30 DIAGNOSIS — E11.9 TYPE 2 DIABETES MELLITUS WITHOUT COMPLICATIONS: ICD-10-CM

## 2018-10-30 DIAGNOSIS — K74.60 UNSPECIFIED CIRRHOSIS OF LIVER: ICD-10-CM

## 2018-10-30 DIAGNOSIS — K80.20 CALCULUS OF GALLBLADDER WITHOUT CHOLECYSTITIS WITHOUT OBSTRUCTION: ICD-10-CM

## 2018-10-30 DIAGNOSIS — E78.5 HYPERLIPIDEMIA, UNSPECIFIED: ICD-10-CM

## 2018-12-11 ENCOUNTER — EMERGENCY (EMERGENCY)
Facility: HOSPITAL | Age: 63
LOS: 0 days | Discharge: HOME | End: 2018-12-11
Attending: EMERGENCY MEDICINE | Admitting: EMERGENCY MEDICINE

## 2018-12-11 VITALS — HEART RATE: 87 BPM | SYSTOLIC BLOOD PRESSURE: 113 MMHG | DIASTOLIC BLOOD PRESSURE: 55 MMHG

## 2018-12-11 VITALS
TEMPERATURE: 97 F | DIASTOLIC BLOOD PRESSURE: 66 MMHG | RESPIRATION RATE: 18 BRPM | SYSTOLIC BLOOD PRESSURE: 133 MMHG | HEART RATE: 99 BPM | OXYGEN SATURATION: 99 %
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
## 2018-12-11 DIAGNOSIS — Z79.899 OTHER LONG TERM (CURRENT) DRUG THERAPY: ICD-10-CM

## 2018-12-11 DIAGNOSIS — Z79.84 LONG TERM (CURRENT) USE OF ORAL HYPOGLYCEMIC DRUGS: ICD-10-CM

## 2018-12-11 DIAGNOSIS — M25.511 PAIN IN RIGHT SHOULDER: ICD-10-CM
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
## 2018-12-11 DIAGNOSIS — Z79.891 LONG TERM (CURRENT) USE OF OPIATE ANALGESIC: ICD-10-CM

## 2018-12-11 DIAGNOSIS — S22.31XA FRACTURE OF ONE RIB, RIGHT SIDE, INITIAL ENCOUNTER FOR CLOSED FRACTURE: ICD-10-CM
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
## 2018-12-11 DIAGNOSIS — K74.60 UNSPECIFIED CIRRHOSIS OF LIVER: ICD-10-CM

## 2018-12-11 PROBLEM — J44.9 CHRONIC OBSTRUCTIVE PULMONARY DISEASE, UNSPECIFIED: Chronic | Status: ACTIVE | Noted: 2018-10-22

## 2018-12-11 PROBLEM — K80.20 CALCULUS OF GALLBLADDER WITHOUT CHOLECYSTITIS WITHOUT OBSTRUCTION: Chronic | Status: ACTIVE | Noted: 2018-10-22

## 2018-12-11 PROBLEM — E11.9 TYPE 2 DIABETES MELLITUS WITHOUT COMPLICATIONS: Chronic | Status: ACTIVE | Noted: 2018-10-22

## 2018-12-11 PROBLEM — I10 ESSENTIAL (PRIMARY) HYPERTENSION: Chronic | Status: ACTIVE | Noted: 2018-10-22

## 2018-12-11 PROBLEM — E78.5 HYPERLIPIDEMIA, UNSPECIFIED: Chronic | Status: ACTIVE | Noted: 2018-10-22

## 2018-12-11 LAB
ALBUMIN SERPL ELPH-MCNC: 3.8 G/DL — SIGNIFICANT CHANGE UP (ref 3.5–5.2)
ALP SERPL-CCNC: 244 U/L — HIGH (ref 30–115)
ALT FLD-CCNC: 64 U/L — HIGH (ref 0–41)
ANION GAP SERPL CALC-SCNC: 16 MMOL/L — HIGH (ref 7–14)
APTT BLD: 40.1 SEC — HIGH (ref 27–39.2)
AST SERPL-CCNC: 76 U/L — HIGH (ref 0–41)
BILIRUB SERPL-MCNC: 1.6 MG/DL — HIGH (ref 0.2–1.2)
BLD GP AB SCN SERPL QL: SIGNIFICANT CHANGE UP
BUN SERPL-MCNC: 12 MG/DL — SIGNIFICANT CHANGE UP (ref 10–20)
CALCIUM SERPL-MCNC: 9.3 MG/DL — SIGNIFICANT CHANGE UP (ref 8.5–10.1)
CHLORIDE SERPL-SCNC: 102 MMOL/L — SIGNIFICANT CHANGE UP (ref 98–110)
CO2 SERPL-SCNC: 24 MMOL/L — SIGNIFICANT CHANGE UP (ref 17–32)
CREAT SERPL-MCNC: 0.6 MG/DL — LOW (ref 0.7–1.5)
GLUCOSE SERPL-MCNC: 172 MG/DL — HIGH (ref 70–99)
HCT VFR BLD CALC: 48.3 % — HIGH (ref 37–47)
HGB BLD-MCNC: 16.1 G/DL — HIGH (ref 12–16)
INR BLD: 1.23 RATIO — SIGNIFICANT CHANGE UP (ref 0.65–1.3)
MCHC RBC-ENTMCNC: 32.1 PG — HIGH (ref 27–31)
MCHC RBC-ENTMCNC: 33.3 G/DL — SIGNIFICANT CHANGE UP (ref 32–37)
MCV RBC AUTO: 96.4 FL — SIGNIFICANT CHANGE UP (ref 81–99)
NRBC # BLD: 0 /100 WBCS — SIGNIFICANT CHANGE UP (ref 0–0)
PLATELET # BLD AUTO: 112 K/UL — LOW (ref 130–400)
POTASSIUM SERPL-MCNC: 4.1 MMOL/L — SIGNIFICANT CHANGE UP (ref 3.5–5)
POTASSIUM SERPL-SCNC: 4.1 MMOL/L — SIGNIFICANT CHANGE UP (ref 3.5–5)
PROT SERPL-MCNC: 6.9 G/DL — SIGNIFICANT CHANGE UP (ref 6–8)
PROTHROM AB SERPL-ACNC: 14.1 SEC — HIGH (ref 9.95–12.87)
RBC # BLD: 5.01 M/UL — SIGNIFICANT CHANGE UP (ref 4.2–5.4)
RBC # FLD: 14.4 % — SIGNIFICANT CHANGE UP (ref 11.5–14.5)
SODIUM SERPL-SCNC: 142 MMOL/L — SIGNIFICANT CHANGE UP (ref 135–146)
TYPE + AB SCN PNL BLD: SIGNIFICANT CHANGE UP
WBC # BLD: 12.04 K/UL — HIGH (ref 4.8–10.8)
WBC # FLD AUTO: 12.04 K/UL — HIGH (ref 4.8–10.8)

## 2018-12-11 RX ORDER — MORPHINE SULFATE 50 MG/1
8 CAPSULE, EXTENDED RELEASE ORAL ONCE
Qty: 0 | Refills: 0 | Status: DISCONTINUED | OUTPATIENT
Start: 2018-12-11 | End: 2018-12-11

## 2018-12-11 RX ORDER — MORPHINE SULFATE 50 MG/1
4 CAPSULE, EXTENDED RELEASE ORAL ONCE
Qty: 0 | Refills: 0 | Status: DISCONTINUED | OUTPATIENT
Start: 2018-12-11 | End: 2018-12-11

## 2018-12-11 RX ORDER — SODIUM CHLORIDE 9 MG/ML
1000 INJECTION, SOLUTION INTRAVENOUS
Qty: 0 | Refills: 0 | Status: DISCONTINUED | OUTPATIENT
Start: 2018-12-11 | End: 2018-12-11

## 2018-12-11 RX ADMIN — MORPHINE SULFATE 8 MILLIGRAM(S): 50 CAPSULE, EXTENDED RELEASE ORAL at 16:56

## 2018-12-11 RX ADMIN — MORPHINE SULFATE 4 MILLIGRAM(S): 50 CAPSULE, EXTENDED RELEASE ORAL at 21:12

## 2018-12-11 NOTE — ED PROVIDER NOTE - PHYSICAL EXAMINATION
CONST: Well appearing in NAD  EYES: PERRL, EOMI, Sclera and conjunctiva clear.   NECK: Non-tender, no meningeal signs  CARD: Normal S1 S2; Normal rate and rhythm  RESP: Equal BS B/L, No wheezes, rhonchi or rales. No distress  GI: Soft, non-tender, non-distended.  MS: Right shoulder with tenderness and decreased abduction, There is tenderness to right chest wall and right SCB. There is ecchymosis to right anterior chest wall. No crepitous appreciated. No midline spinal tenderness.  SKIN: Warm, dry, no acute rashes. Good turgor  NEURO: A&Ox3, No focal deficits. Strength 5/5 with no sensory deficits. Steady gait

## 2018-12-11 NOTE — ED PROVIDER NOTE - PROGRESS NOTE DETAILS
NASIR: Pt endorsed to Dr. Lemus pending Ct Chest and reassessment. Reviewed all results and necessity for follow up. Counseled on red flags and to return for them.  Patient appears well on discharge. pt has spirometer at home, she relates she knows how to use it from rib fracture sustained last month. pt has appointment with GI next week to follow up on liver cirrhosis.

## 2018-12-11 NOTE — ED PROVIDER NOTE - CARE PLAN
Principal Discharge DX:	Rib fracture  Secondary Diagnosis:	Fall  Secondary Diagnosis:	Liver cirrhosis

## 2018-12-11 NOTE — ED PROVIDER NOTE - ATTENDING CONTRIBUTION TO CARE
co anterior and right chest wall pain sp mechanical slip and fall onto marble floor two days ago. No head trauma. No loc, no nausea, vomiting. Ambulatory. Took perc and motrin with minimal relief.   vss, nontoxic, well appearing, airway intact, GCS 15, conjunctiva clear, MMM, no cervical-thoracic-lumbar spine tenderness, neck supple, CTAB, no crepitus over chest wall, superior medial anterior ecchymosis a/w ttp. RRR, equal radial pulses bilat, abd soft/nt/nd, no fnd. FROMx4, strength intact. ambulatory.  Chest wall pain, ecchymosis and tenderness sp fall - r/o fx- CTC pain, control, reassess.

## 2018-12-11 NOTE — ED ADULT NURSE NOTE - OBJECTIVE STATEMENT
Pt 62 y/o female c/o of slip and fall 2 days ago at home on to marble floor, pt states she hit right shoulder and chest, denies hitting head or LOC.

## 2018-12-11 NOTE — ED PROVIDER NOTE - OBJECTIVE STATEMENT
slipped and fell on marble floor 2 days ago and landed on right shoulder and chest. Denies head injury or LOC. Denies neck pain, abd pain, SOB, dizziness, NV. Pt has pain to right shoulder and right chest wall

## 2018-12-11 NOTE — ED ADULT TRIAGE NOTE - CHIEF COMPLAINT QUOTE
slip and fall forward on marble floor x 2 days ago, c/o chest wall pain and right shoulder pain, no LOC

## 2018-12-11 NOTE — ED PROVIDER NOTE - MEDICAL DECISION MAKING DETAILS
s/o to me. 62 y/o female with hx of cirrhosis S/P slip and fall on tile floor 2 days ago. Sustaiend hematoma to right upper anterior chest wall. Lungs equal b/l. CT with no PTX, 2nd rib fracture, chronic cirrhosis. A/P: Pain control, incentive spirometer, will f/u with PMD Josselyn.

## 2019-04-04 ENCOUNTER — OUTPATIENT (OUTPATIENT)
Dept: OUTPATIENT SERVICES | Facility: HOSPITAL | Age: 64
LOS: 1 days | Discharge: HOME | End: 2019-04-04

## 2019-04-04 VITALS
HEART RATE: 86 BPM | HEIGHT: 65 IN | DIASTOLIC BLOOD PRESSURE: 52 MMHG | RESPIRATION RATE: 15 BRPM | OXYGEN SATURATION: 97 % | TEMPERATURE: 97 F | SYSTOLIC BLOOD PRESSURE: 103 MMHG | WEIGHT: 177.03 LBS

## 2019-04-04 VITALS
OXYGEN SATURATION: 98 % | HEART RATE: 72 BPM | SYSTOLIC BLOOD PRESSURE: 92 MMHG | DIASTOLIC BLOOD PRESSURE: 51 MMHG | RESPIRATION RATE: 16 BRPM

## 2019-04-04 DIAGNOSIS — Z98.890 OTHER SPECIFIED POSTPROCEDURAL STATES: Chronic | ICD-10-CM

## 2019-04-04 LAB — GLUCOSE BLDC GLUCOMTR-MCNC: 143 MG/DL — HIGH (ref 70–99)

## 2019-04-04 RX ORDER — ONDANSETRON 8 MG/1
4 TABLET, FILM COATED ORAL ONCE
Qty: 0 | Refills: 0 | Status: DISCONTINUED | OUTPATIENT
Start: 2019-04-04 | End: 2019-04-19

## 2019-04-04 RX ORDER — ACETAMINOPHEN 500 MG
650 TABLET ORAL ONCE
Qty: 0 | Refills: 0 | Status: DISCONTINUED | OUTPATIENT
Start: 2019-04-04 | End: 2019-04-19

## 2019-04-04 NOTE — ASU DISCHARGE PLAN (ADULT/PEDIATRIC) - NURSING INSTRUCTIONS
Pt given preprinted discharge instructions as per Dr. Mora, pt and son verbalized understanding of follow-up

## 2019-04-09 DIAGNOSIS — E78.00 PURE HYPERCHOLESTEROLEMIA, UNSPECIFIED: ICD-10-CM

## 2019-04-09 DIAGNOSIS — H25.9 UNSPECIFIED AGE-RELATED CATARACT: ICD-10-CM

## 2019-04-09 DIAGNOSIS — E11.9 TYPE 2 DIABETES MELLITUS WITHOUT COMPLICATIONS: ICD-10-CM

## 2019-04-09 DIAGNOSIS — J44.9 CHRONIC OBSTRUCTIVE PULMONARY DISEASE, UNSPECIFIED: ICD-10-CM

## 2019-04-09 DIAGNOSIS — I10 ESSENTIAL (PRIMARY) HYPERTENSION: ICD-10-CM

## 2019-05-21 PROBLEM — Z00.00 ENCOUNTER FOR PREVENTIVE HEALTH EXAMINATION: Status: ACTIVE | Noted: 2019-05-21

## 2019-06-07 ENCOUNTER — TRANSCRIPTION ENCOUNTER (OUTPATIENT)
Age: 64
End: 2019-06-07

## 2019-06-17 ENCOUNTER — EMERGENCY (EMERGENCY)
Facility: HOSPITAL | Age: 64
LOS: 0 days | Discharge: HOME | End: 2019-06-18
Attending: EMERGENCY MEDICINE | Admitting: EMERGENCY MEDICINE
Payer: COMMERCIAL

## 2019-06-17 VITALS
RESPIRATION RATE: 18 BRPM | OXYGEN SATURATION: 98 % | HEART RATE: 107 BPM | SYSTOLIC BLOOD PRESSURE: 148 MMHG | DIASTOLIC BLOOD PRESSURE: 74 MMHG
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
## 2019-06-17 DIAGNOSIS — I10 ESSENTIAL (PRIMARY) HYPERTENSION: ICD-10-CM

## 2019-06-17 DIAGNOSIS — Z98.890 OTHER SPECIFIED POSTPROCEDURAL STATES: Chronic | ICD-10-CM

## 2019-06-17 DIAGNOSIS — S01.511A LACERATION WITHOUT FOREIGN BODY OF LIP, INITIAL ENCOUNTER: ICD-10-CM

## 2019-06-17 DIAGNOSIS — S42.101A FRACTURE OF UNSPECIFIED PART OF SCAPULA, RIGHT SHOULDER, INITIAL ENCOUNTER FOR CLOSED FRACTURE: ICD-10-CM
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
## 2019-06-17 DIAGNOSIS — M25.511 PAIN IN RIGHT SHOULDER: ICD-10-CM

## 2019-06-17 DIAGNOSIS — E11.9 TYPE 2 DIABETES MELLITUS WITHOUT COMPLICATIONS: ICD-10-CM
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
## 2019-06-17 DIAGNOSIS — Z79.899 OTHER LONG TERM (CURRENT) DRUG THERAPY: ICD-10-CM

## 2019-06-17 PROCEDURE — 99291 CRITICAL CARE FIRST HOUR: CPT

## 2019-06-17 NOTE — ED ADULT TRIAGE NOTE - CHIEF COMPLAINT QUOTE
" I slipped and fell on the tub, my right shoulder hurts, my mouth was bleeding, bruise in my left thigh." Denies LOC, no blood thinners. " I slipped and fell on the tub, my right shoulder hurts, my mouth was bleeding, bruise in my left thigh." Denies LOC, no blood thinners, + laceration to R  upper lip

## 2019-06-18 VITALS
RESPIRATION RATE: 16 BRPM | DIASTOLIC BLOOD PRESSURE: 55 MMHG | TEMPERATURE: 98 F | SYSTOLIC BLOOD PRESSURE: 111 MMHG | OXYGEN SATURATION: 98 % | HEART RATE: 77 BPM

## 2019-06-18 LAB
ALBUMIN SERPL ELPH-MCNC: 3.5 G/DL — SIGNIFICANT CHANGE UP (ref 3.5–5.2)
ALP SERPL-CCNC: 214 U/L — HIGH (ref 30–115)
ALT FLD-CCNC: 32 U/L — SIGNIFICANT CHANGE UP (ref 0–41)
ANION GAP SERPL CALC-SCNC: 13 MMOL/L — SIGNIFICANT CHANGE UP (ref 7–14)
APTT BLD: 40.3 SEC — HIGH (ref 27–39.2)
AST SERPL-CCNC: 48 U/L — HIGH (ref 0–41)
BASOPHILS # BLD AUTO: 0.07 K/UL — SIGNIFICANT CHANGE UP (ref 0–0.2)
BASOPHILS NFR BLD AUTO: 0.5 % — SIGNIFICANT CHANGE UP (ref 0–1)
BILIRUB SERPL-MCNC: 1.3 MG/DL — HIGH (ref 0.2–1.2)
BLD GP AB SCN SERPL QL: SIGNIFICANT CHANGE UP
BUN SERPL-MCNC: 13 MG/DL — SIGNIFICANT CHANGE UP (ref 10–20)
CALCIUM SERPL-MCNC: 9.1 MG/DL — SIGNIFICANT CHANGE UP (ref 8.5–10.1)
CHLORIDE SERPL-SCNC: 103 MMOL/L — SIGNIFICANT CHANGE UP (ref 98–110)
CO2 SERPL-SCNC: 20 MMOL/L — SIGNIFICANT CHANGE UP (ref 17–32)
CREAT SERPL-MCNC: 0.8 MG/DL — SIGNIFICANT CHANGE UP (ref 0.7–1.5)
EOSINOPHIL # BLD AUTO: 0.46 K/UL — SIGNIFICANT CHANGE UP (ref 0–0.7)
EOSINOPHIL NFR BLD AUTO: 3.6 % — SIGNIFICANT CHANGE UP (ref 0–8)
GLUCOSE SERPL-MCNC: 213 MG/DL — HIGH (ref 70–99)
HCT VFR BLD CALC: 49.7 % — HIGH (ref 37–47)
HGB BLD-MCNC: 17.1 G/DL — HIGH (ref 12–16)
IMM GRANULOCYTES NFR BLD AUTO: 0.5 % — HIGH (ref 0.1–0.3)
INR BLD: 1.35 RATIO — HIGH (ref 0.65–1.3)
LACTATE SERPL-SCNC: 1.5 MMOL/L — SIGNIFICANT CHANGE UP (ref 0.5–2.2)
LYMPHOCYTES # BLD AUTO: 1.9 K/UL — SIGNIFICANT CHANGE UP (ref 1.2–3.4)
LYMPHOCYTES # BLD AUTO: 14.8 % — LOW (ref 20.5–51.1)
MCHC RBC-ENTMCNC: 33.3 PG — HIGH (ref 27–31)
MCHC RBC-ENTMCNC: 34.4 G/DL — SIGNIFICANT CHANGE UP (ref 32–37)
MCV RBC AUTO: 96.9 FL — SIGNIFICANT CHANGE UP (ref 81–99)
MONOCYTES # BLD AUTO: 0.93 K/UL — HIGH (ref 0.1–0.6)
MONOCYTES NFR BLD AUTO: 7.3 % — SIGNIFICANT CHANGE UP (ref 1.7–9.3)
NEUTROPHILS # BLD AUTO: 9.37 K/UL — HIGH (ref 1.4–6.5)
NEUTROPHILS NFR BLD AUTO: 73.3 % — SIGNIFICANT CHANGE UP (ref 42.2–75.2)
NRBC # BLD: 0 /100 WBCS — SIGNIFICANT CHANGE UP (ref 0–0)
PLATELET # BLD AUTO: 109 K/UL — LOW (ref 130–400)
POTASSIUM SERPL-MCNC: 4.6 MMOL/L — SIGNIFICANT CHANGE UP (ref 3.5–5)
POTASSIUM SERPL-SCNC: 4.6 MMOL/L — SIGNIFICANT CHANGE UP (ref 3.5–5)
PROT SERPL-MCNC: 7.4 G/DL — SIGNIFICANT CHANGE UP (ref 6–8)
PROTHROM AB SERPL-ACNC: 15.5 SEC — HIGH (ref 9.95–12.87)
RBC # BLD: 5.13 M/UL — SIGNIFICANT CHANGE UP (ref 4.2–5.4)
RBC # FLD: 14.7 % — HIGH (ref 11.5–14.5)
SODIUM SERPL-SCNC: 136 MMOL/L — SIGNIFICANT CHANGE UP (ref 135–146)
WBC # BLD: 12.8 K/UL — HIGH (ref 4.8–10.8)
WBC # FLD AUTO: 12.8 K/UL — HIGH (ref 4.8–10.8)

## 2019-06-18 PROCEDURE — 73060 X-RAY EXAM OF HUMERUS: CPT | Mod: 26,RT

## 2019-06-18 PROCEDURE — 70450 CT HEAD/BRAIN W/O DYE: CPT | Mod: 26

## 2019-06-18 PROCEDURE — 74177 CT ABD & PELVIS W/CONTRAST: CPT | Mod: 26

## 2019-06-18 PROCEDURE — 99282 EMERGENCY DEPT VISIT SF MDM: CPT

## 2019-06-18 PROCEDURE — 71260 CT THORAX DX C+: CPT | Mod: 26

## 2019-06-18 PROCEDURE — 71045 X-RAY EXAM CHEST 1 VIEW: CPT | Mod: 26

## 2019-06-18 PROCEDURE — 72125 CT NECK SPINE W/O DYE: CPT | Mod: 26

## 2019-06-18 PROCEDURE — 73030 X-RAY EXAM OF SHOULDER: CPT | Mod: 26,RT

## 2019-06-18 PROCEDURE — 70486 CT MAXILLOFACIAL W/O DYE: CPT | Mod: 26

## 2019-06-18 PROCEDURE — 72170 X-RAY EXAM OF PELVIS: CPT | Mod: 26

## 2019-06-18 RX ORDER — ONDANSETRON 8 MG/1
4 TABLET, FILM COATED ORAL ONCE
Refills: 0 | Status: COMPLETED | OUTPATIENT
Start: 2019-06-18 | End: 2019-06-18

## 2019-06-18 RX ORDER — MORPHINE SULFATE 50 MG/1
4 CAPSULE, EXTENDED RELEASE ORAL ONCE
Refills: 0 | Status: DISCONTINUED | OUTPATIENT
Start: 2019-06-18 | End: 2019-06-18

## 2019-06-18 RX ORDER — OXYCODONE AND ACETAMINOPHEN 5; 325 MG/1; MG/1
1 TABLET ORAL ONCE
Refills: 0 | Status: DISCONTINUED | OUTPATIENT
Start: 2019-06-18 | End: 2019-06-18

## 2019-06-18 RX ORDER — MORPHINE SULFATE 50 MG/1
2 CAPSULE, EXTENDED RELEASE ORAL ONCE
Refills: 0 | Status: DISCONTINUED | OUTPATIENT
Start: 2019-06-18 | End: 2019-06-18

## 2019-06-18 RX ADMIN — MORPHINE SULFATE 4 MILLIGRAM(S): 50 CAPSULE, EXTENDED RELEASE ORAL at 06:01

## 2019-06-18 RX ADMIN — MORPHINE SULFATE 2 MILLIGRAM(S): 50 CAPSULE, EXTENDED RELEASE ORAL at 13:23

## 2019-06-18 RX ADMIN — ONDANSETRON 4 MILLIGRAM(S): 8 TABLET, FILM COATED ORAL at 06:01

## 2019-06-18 RX ADMIN — OXYCODONE AND ACETAMINOPHEN 1 TABLET(S): 5; 325 TABLET ORAL at 00:11

## 2019-06-18 RX ADMIN — MORPHINE SULFATE 4 MILLIGRAM(S): 50 CAPSULE, EXTENDED RELEASE ORAL at 06:52

## 2019-06-18 NOTE — ED ADULT NURSE NOTE - CHIEF COMPLAINT QUOTE
" I slipped and fell on the tub, my right shoulder hurts, my mouth was bleeding, bruise in my left thigh." Denies LOC, no blood thinners, + laceration to R  upper lip

## 2019-06-18 NOTE — CONSULT NOTE ADULT - ASSESSMENT
Medical history reviewed.  Blood pressure taken. Patient reports trauma to the face falling a fall from the shower.    Clinical exam: Upper right side lip laceration caused by the tooth. Subluxation of Teeth # 6 and #7. Mobile tooth #5  Radiographic exam: Periapical teeth #5-8. Tooth #5 has periodontal bone loss  Plan: Reposition teeth #6 and #7, Splint teeth #5 to #11, Suture laceration  Procedure: Explained to patient that tooth #5 has poor prognosis and will require extraction, but will be used for anchorage for the splint. Explained to patient that there is guarded prognosis of #6 and #7 due to the trauma. Explained that future endodontic treatment may be required or extraction. Strongly encouraged patient to find a a dentist for follow-up care to remove the splint and sutures in 2 weeks and then for comprehensive care. Administered 1 carpule of 2% lidocaine 1:100,000 epinephrine into the upper right lip. Administered 2 carpules of 2% lidocaine 1:100,000 epinephrine and 2 carpules of 4% articaine 1:100,000 epinephrine via buccal/palatal infiltration. Teeth #6 and #7 were repositioned into the sockets. Lpop used on teeth #5 to #11. Orthodontic wire with flowable composite used to splint teeth #5 to #11. Smoothed for patient comfort. Ten single interrupted  5-0 Proline sutures placed on the upper right lip laceration. Post operative instructions given to the patient.   Prescriptions: Amoxicillin 500 mg q8h and Chlorhexidine gluconate mouthrinse.   Followup: With private dentist or the ED for splint and suture removal and comprehensive care

## 2019-06-18 NOTE — ED ADULT NURSE REASSESSMENT NOTE - NS ED NURSE REASSESS COMMENT FT1
Pt ambulated with assist to restroom. Ambulated with steady gait. Right arm sling in place, awaiting dispo at this time. Vitals are stable, pt recently received morphine for pain, will cont to monitor.

## 2019-06-18 NOTE — CONSULT NOTE ADULT - SUBJECTIVE AND OBJECTIVE BOX
Patient is a 63y old  Female who presents with a chief complaint of upper teeth pain and cut lip from falling    HPI: happened this evening      PAST MEDICAL & SURGICAL HISTORY:  Liver cirrhosis  Gall stones  HLD (hyperlipidemia)  HTN (hypertension)  DM (diabetes mellitus)  COPD (chronic obstructive pulmonary disease)  History of surgery: HYSTERECTOMY, LEFT CATARACT, LEFT KNEE SURGERY    (   ) heart valve replacement  (   ) joint replacement  (   ) pregnancy    MEDICATIONS  (STANDING):    MEDICATIONS  (PRN):      REVIEW OF SYSTEMS      General:	    Skin/Breast:  	  Ophthalmologic:  	  ENMT:	    Respiratory and Thorax:  	  Cardiovascular:	    Gastrointestinal:	    Genitourinary:	    Musculoskeletal:	    Neurological:	    Psychiatric:	    Hematology/Lymphatics:	    Endocrine:	    Allergic/Immunologic:	    Allergies    No Known Allergies    Intolerances        FAMILY HISTORY:  No pertinent family history in first degree relatives      *SOCIAL HISTORY: (   ) Tobacco; (   ) ETOH    Vital Signs Last 24 Hrs  T(C): --  T(F): --  HR: 107 (17 Jun 2019 22:00) (107 - 107)  BP: 148/74 (17 Jun 2019 22:00) (148/74 - 148/74)  BP(mean): --  RR: 18 (17 Jun 2019 22:00) (18 - 18)  SpO2: 98% (17 Jun 2019 22:00) (98% - 98%)    LABS:        *ASSESSMENT: Patient mentioned she slipped in the shower and cut her upper lip and hurt her teeth. Upon clinical exam, no active bleeding noted, #7 displaced faciomesially into #8 (non mobile), bruising of gingiva surrounding #7. Upper lip lacerated. Placed 4-0 vicryl suture in upper lip, and recommended antibiotics, pain medication, soft diet and follow up with dentist for comprehensive evaluation asap. Patient understood.        RECOMMENDATIONS:  1) Abx, pain med, private dentist follow up  2) Dental F/U with outpatient dentist for comprehensive dental care.   3) If any difficulty swallowing/breathing, fever occur, return to ER.

## 2019-06-18 NOTE — ED PROVIDER NOTE - PROGRESS NOTE DETAILS
Trauma consulted spoke to Dung munoz ortho consult. Ortho consulted spoke to CECIL Mccartney will see patient tomorrow. Endorsed case to resident Dr. El. Sign out received by Dr. El. Ortho and surgery called for disposition.  They will come as soon as they can they said. Sign out received by Dr. El. Ortho and surgery called for disposition.  They will come as soon as they can they said. ophthalmology aware Patient has no additional injuries, has chronic L sided lens subluxation, is still pending ortho and trauma consult -- case endorsed to Dr. Bey -- dispo per ortho and trauma ophtho at bedside. spoke to Dr. Santos.  he looked at images, okay to dc. f/u in 2 weeks atoffice.  sling until then. trauma cleared pt.  ophtho cleared pt. pt was actually aou to dental, pt discharged was placed in order for pt to have records.

## 2019-06-18 NOTE — ED ADULT NURSE NOTE - OBJECTIVE STATEMENT
pt slipped and fell in the bathtub, hit her face onto a door frame, broke her tooth. no LOC, not on anticoagulations. also c/o right arm pain.

## 2019-06-18 NOTE — CONSULT NOTE ADULT - CONSULT REASON
FACIAL TRAUMA
Gum laceration, displaced tooth
RIGHT SCAPULA FRACTURE
s/p fall, -HT, -AC, -LOC
lens dislocation

## 2019-06-18 NOTE — ED PROVIDER NOTE - NSFOLLOWUPINSTRUCTIONS_ED_ALL_ED_FT
Please follow up with orthopedics.  Keep sling until you see orthopedics.  a couple of times a day try to move arm at shoulder to prevent freezing.  Please follow up with ophtho.  Please return for any complications.     we will transfer you to dental.

## 2019-06-18 NOTE — CONSULT NOTE ADULT - ASSESSMENT
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 Mora regarding post surgical follow up; advised Pt to be examined at [facility] if another opinion desired.    3) Chorioretinal Scarring OS  - chronic, monitor

## 2019-06-18 NOTE — CONSULT NOTE ADULT - SUBJECTIVE AND OBJECTIVE BOX
Orthopaedics Consult Note    HE BRIZUELA  [id number]    Patient is a 63y year old Female with PMH HTN, HLD, DM, COPD, and chronic neck and back pain on disability who presented to the ED after fall in the shower. Patient states that she slipped and struck her face and right shoulder on the edge of the shower. She had a facial laceration and complained of right shoulder pain. Radiographs and CT were obtained which revealed a right scapula fracture. Ortho is consulted for evaluation    PMH/PSH  Liver cirrhosis  Gall stones  HLD (hyperlipidemia)  HTN (hypertension)  DM (diabetes mellitus)  COPD (chronic obstructive pulmonary disease)    Medications  Home Medications:  furosemide 40 mg oral tablet: 1 tab(s) orally once a day (07 Jun 2019 08:34)  KlonoPIN 0.5 mg oral tablet: 1 tab(s) orally once a day (07 Jun 2019 08:34)  Lipitor 40 mg oral tablet: 1 tab(s) orally once a day (07 Jun 2019 08:34)  metFORMIN 500 mg oral tablet: 1 tab(s) orally 2 times a day (07 Jun 2019 08:34)  Paxil 20 mg oral tablet: 1 tab(s) orally once a day (07 Jun 2019 08:34)      Allergies  No Known Allergies        T(C): 36.4 (06-18-19 @ 07:20), Max: 36.4 (06-18-19 @ 07:20)  HR: 76 (06-18-19 @ 07:20) (71 - 107)  BP: 98/54 (06-18-19 @ 07:20) (96/48 - 148/74)  RR: 20 (06-18-19 @ 07:20) (18 - 20)  SpO2: 97% (06-18-19 @ 07:20) (97% - 98%)    Physical Exam  NAD, AAOx3  Breathing comfortably on RA  Resting comfortably    RUE  Sling in place  TTP over right upper back  Non-TTP arm, elbow, forearm  No open wounds  No deformity/laceration/abrasion noted  No swelling/erythema/ecchymosis noted  Motor: AIN/PIN/Ulnar intact  Sensory: mild decreased sensation Ax; SILT M/R/U  Vasc: hand WWP, 2+ radial pulse    Labs                        17.1   12.80 )-----------( 109      ( 18 Jun 2019 01:00 )             49.7     06-18    136  |  103  |  13  ----------------------------<  213<H>  4.6   |  20  |  0.8    Ca    9.1      18 Jun 2019 01:00    TPro  7.4  /  Alb  3.5  /  TBili  1.3<H>  /  DBili  x   /  AST  48<H>  /  ALT  32  /  AlkPhos  214<H>  06-18    LIVER FUNCTIONS - ( 18 Jun 2019 01:00 )  Alb: 3.5 g/dL / Pro: 7.4 g/dL / ALK PHOS: 214 U/L / ALT: 32 U/L / AST: 48 U/L / GGT: x           PT/INR - ( 18 Jun 2019 01:00 )   PT: 15.50 sec;   INR: 1.35 ratio         PTT - ( 18 Jun 2019 01:00 )  PTT:40.3 sec    Img  R shoulder XR  Fracture of the scapular spine  No other fracture or dislocation    Chest CT  Partially visualized right scapula fracture    A/P: Patient is a 63y year old Female with right scapula fracture    Sling RUE  NWB on RUE  Further plan pending Attending review of images

## 2019-06-18 NOTE — ED PROVIDER NOTE - CARE PROVIDER_API CALL
Ranjeet Santos)  Orthopaedic Surgery  [address] [place] [address]  Phone: [phone]  Fax: [phone]  Follow Up Time:

## 2019-06-18 NOTE — ED PROVIDER NOTE - OBJECTIVE STATEMENT
62 yo F with pmhx of DM, HTN, HLD, liver cirrhosis presenting s/p mechanical fall - patient slipped and fell out of tub hitting front of face and right shoulder. No LOC. No anticoagulation. Denies recent alcohol use. Reports right shoulder pain radiating to right arm. Pain, sharp and worse with movement, better with rest. No cp, sob, fever, chills, abdominal pain, nausea, vomiting, diarrhea, back pain, urinary symptoms, headache, dizziness, paresthesias, or weakness.

## 2019-06-18 NOTE — CONSULT NOTE ADULT - SUBJECTIVE AND OBJECTIVE BOX
TRAUMA ACTIVATION LEVEL:  2 consult    MECHANISM OF INJURY:      [] Blunt  	[] MVC	[x] Fall	[] Pedestrian Struck	[] Motorcycle   [] Assault   [] Bicycle collision  [] Sports injury     [] Penetrating  	[] Gun Shot Wound 		[] Stab Wound    GCS: 	E: 4	V: 5	M: 6      HPI: 62 yo F with pmhx of DM, HTN, HLD, liver cirrhosis presenting s/p mechanical fall -LOC, -HT, -AC. Pt states that she slipped and fell out of tub hitting front of face and right shoulder. Denies recent alcohol use. Reports right shoulder pain radiating to right arm. Pain, sharp and worse with movement, better with rest. No cp, sob, fever, chills, abdominal pain, nausea, vomiting, diarrhea, back pain, urinary symptoms, headache, dizziness, paresthesias, or weakness.      PAST MEDICAL & SURGICAL HISTORY:  Liver cirrhosis  Gall stones  HLD (hyperlipidemia)  HTN (hypertension)  DM (diabetes mellitus)  COPD (chronic obstructive pulmonary disease)  History of surgery: HYSTERECTOMY, LEFT CATARACT, LEFT KNEE SURGERY      Allergies    No Known Allergies    Home Medications:  furosemide 40 mg oral tablet: 1 tab(s) orally once a day (07 Jun 2019 08:34)  KlonoPIN 0.5 mg oral tablet: 1 tab(s) orally once a day (07 Jun 2019 08:34)  Lipitor 40 mg oral tablet: 1 tab(s) orally once a day (07 Jun 2019 08:34)  metFORMIN 500 mg oral tablet: 1 tab(s) orally 2 times a day (07 Jun 2019 08:34)  Paxil 20 mg oral tablet: 1 tab(s) orally once a day (07 Jun 2019 08:34)      ROS: 10-system review is otherwise negative except HPI above.      Primary Survey:    A - airway intact  B - bilateral breath sounds and good chest rise  C - palpable pulses in all extremities  D - GCS 15 on arrival, HURTADO  Exposure obtained    Vital Signs Last 24 Hrs  T(C): --  T(F): --  HR: 107 (17 Jun 2019 22:00) (107 - 107)  BP: 148/74 (17 Jun 2019 22:00) (148/74 - 148/74)  BP(mean): --  RR: 18 (17 Jun 2019 22:00) (18 - 18)  SpO2: 98% (17 Jun 2019 22:00) (98% - 98%)    Secondary Survey:   General: NAD  HEENT: Normocephalic, atraumatic, EOMI, PEERLA. no scalp lacerations. R lip lac, repaired.   Neck: Soft, midline trachea. no cspine tenderness  Chest: No chest wall tenderness. or subq  emphysema   Cardiac: S1, S2, RRR  Respiratory: Bilateral breath sounds, clear and equal bilaterally  Abdomen: Soft, non-distended, non-tender, no rebound,   Groin: Normal appearing, pelvis stable   Ext: palp radial b/l UE, b/l DP palp in Lower Extrem. RUE in sling. Bruising over the medial aspects of the LUE and LLE  Back: no TTP, no palpable runoff/stepoff/deformity  Rectal: No jayy blood, RASHMI with good tone      Procedures: Lip lac sutured by dental team    LABS:    CAPILLARY BLOOD GLUCOSE      POCT Blood Glucose.: 160 mg/dL (18 Jun 2019 02:39)                          17.1   12.80 )-----------( 109      ( 18 Jun 2019 01:00 )             49.7       Auto Neutrophil %: 73.3 % (06-18-19 @ 01:00)  Auto Immature Granulocyte %: 0.5 % (06-18-19 @ 01:00)    06-18    136  |  103  |  13  ----------------------------<  213<H>  4.6   |  20  |  0.8      Calcium, Total Serum: 9.1 mg/dL (06-18-19 @ 01:00)      LFTs:             7.4  | 1.3  | 48       ------------------[214     ( 18 Jun 2019 01:00 )  3.5  | x    | 32          Lipase:x      Amylase:x         Lactate, Blood: 1.5 mmol/L (06-18-19 @ 01:00)      Coags:     15.50  ----< 1.35    ( 18 Jun 2019 01:00 )     40.3       RADIOLOGY & ADDITIONAL STUDIES:      < from: CT Chest w/ IV Cont (06.18.19 @ 02:53) >  Moderate left pleural effusion with compressive atelectasis.    Shrunken/nodular contour liver, suggestive of cirrhosis. Trace   perihepatic and pelvic ascites.    No CT evidence of acute intra-abdominal traumatic injury.    < end of copied text >    < from: CT Cervical Spine No Cont (06.18.19 @ 02:36) >  No acute cervical spine fracture or subluxation.    < end of copied text >    < from: Xray Pelvis AP only (06.18.19 @ 03:24) >  No acute displaced fracture.    < end of copied text >    < from: Xray Shoulder 2 Views, Right (12.11.18 @ 16:30) >  No evidence of acute fracture.    < end of copied text >        ---------------------------------------------------------------------------------------

## 2019-06-18 NOTE — ED PROVIDER NOTE - CLINICAL SUMMARY MEDICAL DECISION MAKING FREE TEXT BOX
pw fall, scapular fracture found. Trauma and ortho evaluations obtained after many hours of consultations and calling for particular attendings. Dispo home per their consultation. Patient to be discharged from ED. Any available test results were discussed with patient and/or family. Verbal instructions given, including instructions to return to ED immediately for any new, worsening, or concerning symptoms. Patient endorsed understanding. Written discharge instructions additionally given, including follow-up plan.

## 2019-06-18 NOTE — ED PROVIDER NOTE - NSFOLLOWUPCLINICS_GEN_ALL_ED_FT
[facility]  Dental  [address] [place] [address]  Phone: [phone]  Fax:   Follow Up Time:     [facility]  Ophthalmolgy  [address], [address]  [place] [address]  Phone: [phone]  Fax:   Follow Up Time:

## 2019-06-18 NOTE — CONSULT NOTE ADULT - SUBJECTIVE AND OBJECTIVE BOX
HE BRIZUELA  MRN-[id number]  63y Female      Patient is a 63y old  Female who presents with a chief complaint of   HEALTH ISSUES - R/O PROBLEM Dx:    HPI:      Extended HPI:  (-)burning, itching, redness, tearing OD/OS  (-)flashes, floaters, eye pain OD/OS    PAST MEDICAL & SURGICAL HISTORY:  Liver cirrhosis  Gall stones  HLD (hyperlipidemia)  HTN (hypertension)  DM (diabetes mellitus)  COPD (chronic obstructive pulmonary disease)  History of surgery: HYSTERECTOMY, LEFT CATARACT, LEFT KNEE SURGERY    MEDICATIONS  (STANDING):    MEDICATIONS  (PRN):    Allergies    No Known Allergies    Intolerances        POHx:  MIRTHA:   (-)injuries/surgeries    Ocular Medications: none    FOHx: Non-contributory    VISUAL ACUITY  OD: 20/ sc/cc PH: 20/  OS 20/ sc/cc PH: 20/    NEURO TESTING  Pupils: 	PERRL, (-) APD OD/OS  EOMS: 	FULL OD/OS  CVF: 	Full to red light OD/OS    ANTERIOR SEGMENT EVALUATION:   lids/lashes: 	clear OD/OS  conjunctiva: 	clear OD/OS  cornea: 	clear OD/OS  anterior chamber: deep & quiet OD/OS  iris: 	flat and even OD/OS    INTRAOCULAR PRESSURE  Goldmann Applanation Tonometry/Tonopen  OD: mmHg  OS: mmHg  @    POSTERIOR SEGMENT EVALUATION  Dilated: Tropicamide 1%, Phenylephrine 2.5% OD/OS  Lens: 		clear OD/OS  Vitreous: 	clear OD/OS  Optic nerve:	distinct, pink and healthy OD/OS  Macula: 	flat, even OD/OS  Vessels: 	2:3 OD/OS  Periphery: 	flat, (-)holes/breaks/tears 360 OD/OS    SUPPLEMENTARY TESTING: HE BRIZUELA  MRN-[id number]  63y Female      Patient is a 63y old  Female who presents with a chief complaint of shoulder pain s/p fall     HPI: 62 yo F with pmhx of DM, HTN, HLD, liver cirrhosis presenting s/p mechanical fall -LOC, -HT, -AC. Pt states that she slipped and fell out of tub hitting front of face and right shoulder. Denies recent alcohol use. Reports right shoulder pain radiating to right arm. Pain, sharp and worse with movement, better with rest. No cp, sob, fever, chills, abdominal pain, nausea, vomiting, diarrhea, back pain, urinary symptoms, headache, dizziness, paresthesias, or weakness.    PAST MEDICAL & SURGICAL HISTORY:  Liver cirrhosis  Gall stones  HLD (hyperlipidemia)  HTN (hypertension)  DM (diabetes mellitus)  COPD (chronic obstructive pulmonary disease)  History of surgery: HYSTERECTOMY, LEFT CATARACT, LEFT KNEE SURGERY    Extended HPI: Pt denies acute ocular issues s/p fall. Pt denies new flashes, floaters or eye pain.     Pt reports history of recent cataract surgery OD with Dr. Mora (06/04/19). Pt c/o increased floaters OD s/p surgery. Pt's surgeon is aware she has increased floaters and has examined her several times since then with no significant findings. Pt reports history of poor vision OS since childhood - unclear etiology. Pt has cataract surgery in the left eye many years ago with lens dislocation s/p surgery.   (-)burning, itching, redness, tearing OD/OS  (-)flashes, floaters, eye pain OD/OS    PAST MEDICAL & SURGICAL HISTORY:  Liver cirrhosis  Gall stones  HLD (hyperlipidemia)  HTN (hypertension)  DM (diabetes mellitus)  COPD (chronic obstructive pulmonary disease)  History of surgery: HYSTERECTOMY, LEFT CATARACT, LEFT KNEE SURGERY    MEDICATIONS  (STANDING):  furosemide 40 mg oral tablet: 1 tab(s) orally once a day (07 Jun 2019 08:34)  KlonoPIN 0.5 mg oral tablet: 1 tab(s) orally once a day (07 Jun 2019 08:34)  Lipitor 40 mg oral tablet: 1 tab(s) orally once a day (07 Jun 2019 08:34)  metFORMIN 500 mg oral tablet: 1 tab(s) orally 2 times a day (07 Jun 2019 08:34)  Paxil 20 mg oral tablet: 1 tab(s) orally once a day (07 Jun 2019 08:34)  MEDICATIONS  (PRN):    Allergies  No Known Allergies    Intolerances  none    POHx:  MIRTHA: 1 week c Dr. Mora  s/p cataract extraction OD (06/04/19 c Dr. Mora); OS - >10 years? - complicated course  Lens dislocation OS  Congenital poor vision   (-)injuries/surgeries    Ocular Medications: none    FOHx: Non-contributory    VISUAL ACUITY c near card  OD: sc 20/50  PH: 20/30-  OS  sc FC at 2 ft; PH: NI    NEURO TESTING  Pupils: 	PERRL, (-) APD OD/OS  EOMS: 	FULL OD/OS  CVF: 	Full to red light OD/OS    ANTERIOR SEGMENT EVALUATION:   lids/lashes: 	clear OD/OS  conjunctiva: 	clear OD/OS  cornea: 	clear OD/OS  anterior chamber: deep & formed OD/OS  iris: 	flat and even OD/OS    INTRAOCULAR PRESSURE  Tonopen  OD: 13 mmHg  OS: 17 mmHg  @9:55am    POSTERIOR SEGMENT EVALUATION  Dilated: Tropicamide 1%, Phenylephrine 2.5% OD/OS  Lens: 		OD: PCIOL, centered, clear; OS: dropped PCIOL  Vitreous: 	OD: clear; OS: PCIOL inferior   Optic nerve:	OD: distinct, pink; OS: extensive PPA  Macula: 	OD: flat, even; OS: extensive scarring to sclera  Vessels: 	2:3 OD/OS  Periphery: 	OD: flat, (-)holes/breaks/tears 360; OS: scattered chorioretinal scarring, (-)holes/breaks/tears 360

## 2019-06-18 NOTE — ED PROVIDER NOTE - ATTENDING CONTRIBUTION TO CARE
64 yo F, history of HTN, DM II, COPD, alcohol use with cirrhosis, HLD here for assessment sp fall. Patient slipped getting out of bathtub, hit her face and R shoulder on the floor or the tub. She is not on any AC, denies EtOH today. No LOC.     In ED has lip laceration and apparent movement of tooth 7, tenderness over R shoulder, bruising to R arm.    Initial plan was XR imaginign of R shoulder, CT of face and head, however XR of shoulder demonstrates scapular fracture -- given this and the force required to generate this type of injury, will need pan CT scan, trauma consult.

## 2019-06-18 NOTE — ED PROVIDER NOTE - PHYSICAL EXAMINATION
VITAL SIGNS: I have reviewed nursing notes and confirm.  CONSTITUTIONAL: Well-developed; well-nourished; in no acute distress.  SKIN: Skin exam is warm and dry, no acute rash.  HEAD: Normocephalic; atraumatic.  EYES: PERRL, EOM intact; conjunctiva and sclera clear.  ENT: +1.5cm lip laceration right upper lip. #7 displaced faciomesially into #8, bruising of gingiva surrounding #7. Upper lip lacerated. No nasal discharge; airway clear.   NECK: Supple; non tender. No C-spine tenderness.   CARD: S1, S2 normal; no murmurs, gallops, or rubs. Regular rate and rhythm.  RESP: Clear to auscultation bilaterally. No wheezes, rales or rhonchi.  ABD: Normal bowel sounds; soft; non-distended; non-tender.   EXT/MSK: +Tenderness to right shoulder with decreased ROM. No midline tenderness. No bony deformities.   LYMPH: No acute cervical adenopathy.  NEURO: Alert, oriented. Grossly unremarkable. No focal deficits.  PSYCH: Cooperative, appropriate.

## 2019-06-18 NOTE — ED PROVIDER NOTE - NS ED ROS FT
Review of Systems:  	•	CONSTITUTIONAL - no fever, no diaphoresis, no chills  	•	SKIN - +laceration, no rash  	•	HEMATOLOGIC - no bleeding, no bruising  	•	EYES - no eye pain, no blurry vision  	•	ENT - +gum bleeding, no congestion  	•	RESPIRATORY - no shortness of breath, no cough  	•	CARDIAC - no chest pain, no palpitations  	•	GI - no abd pain, no nausea, no vomiting, no diarrhea, no constipation  	•	GENITO-URINARY - no dysuria; no hematuria, no increased urinary frequency  	•	MUSCULOSKELETAL - +shoulder pain, no swelling, no redness  	•	NEUROLOGIC - no weakness, no headache, no paresthesias, no LOC  	•	PSYCH - no anxiety, no depression  	All other ROS are negative except as documented in HPI.

## 2019-06-18 NOTE — CONSULT NOTE ADULT - ASSESSMENT
ASSESSMENT:  63y old f s/p mechanical fall -LOC, -HT, -AC    PLAN:    - Lac repaired, some bruising, R shoulder pain, no other external signs of trauma  - No acute intrathoracic, intraabdominal path on imaging  - F/u final reads  - Shoulder xray and CTs prelim negative  - Tertiary survey, s/o  -  d/w ASSESSMENT:  63y old f s/p mechanical fall -LOC, -HT, -AC    PLAN:    - Lac repaired, some bruising, R shoulder pain, no other external signs of trauma  - No acute intrathoracic, intraabdominal path on imaging  - F/u final reads    Senior Trauma Resident Note  Airway intact  Bilateral Breath Sounds  Palpable pulses in 4 ext  GCS 15, PERRL, HURTADO  hemodynamically stable  No Subq emphysema, abdominal tenderness, or pelvic instability   Ct findings as above  RT shoulder pain, CT scan show RT scapular fx will need ortho eval to clear for discharge   Will Dispo accordingly  Plan as above d/w Dr. Elliott

## 2019-07-25 ENCOUNTER — APPOINTMENT (OUTPATIENT)
Dept: SURGERY | Facility: CLINIC | Age: 64
End: 2019-07-25
Payer: COMMERCIAL

## 2019-07-25 VITALS — HEIGHT: 65 IN | BODY MASS INDEX: 29.32 KG/M2 | WEIGHT: 176 LBS

## 2019-07-25 DIAGNOSIS — Z87.898 PERSONAL HISTORY OF OTHER SPECIFIED CONDITIONS: ICD-10-CM

## 2019-07-25 PROCEDURE — 99204 OFFICE O/P NEW MOD 45 MIN: CPT

## 2019-07-25 NOTE — ASSESSMENT
[FreeTextEntry1] : Татьяна is a pleasant 64-year-old woman with a past medical history significant for alcohol related cirrhosis with ascites, diabetes, COPD along with cigarette smoking, hypercholesterolemia, GERD and anxiety who presents to the office today with concerns about a tender bulge just above the umbilicus suspicious for a hernia.\par \par Physical examination demonstrates a strawberry size bulge 3 fingerbreadths above the umbilicus which is tender to palpation and only partially reducible consistent with an incarcerated symptomatic ventral hernia warranting surgical repair. She does have a mild to moderate diastasis recti which is most likely related to her excess abdominal weight. Her current BMI is 29. There is no evidence of ascites by physical examination today and I reviewed her CT scan done one month ago which demonstrated minimal pelvic ascitic fluid.\par \par I explained the pros and cons of surgery, as well as all risks, benefits, indications and alternatives of the procedure and the patient understood and agreed. Татьяна was scheduled for the repair of her symptomatic incarcerated ventral hernia with mesh on Friday, August 23, 2019 under local with IV sedation at [facility] with presurgical testing preceding this date. The patient was encouraged to avoid heavy lifting and strenuous activity in the interim, of course.

## 2019-07-25 NOTE — CONSULT LETTER
[Dear  ___] : Dear  [unfilled], [Courtesy Letter:] : I had the pleasure of seeing your patient, [unfilled], in my office today. [Please see my note below.] : Please see my note below. [Consult Closing:] : Thank you very much for allowing me to participate in the care of this patient.  If you have any questions, please do not hesitate to contact me. [DrCallie  ___] : Dr. JHA [DrCallie ___] : Dr. JHA [FreeTextEntry3] : Respectfully,\par \par Jayant Goodman M.D., FACS\par

## 2019-07-25 NOTE — PHYSICAL EXAM
[Normal Breath Sounds] : Normal breath sounds [No Rash or Lesion] : No rash or lesion [Alert] : alert [Calm] : calm [JVD] : no jugular venous distention  [de-identified] : overweight [de-identified] : normal [de-identified] : mildly protuberant abdomen, diastasis recti\par \par  [de-identified] : incarcerated ventral hernia

## 2019-08-09 ENCOUNTER — OUTPATIENT (OUTPATIENT)
Dept: OUTPATIENT SERVICES | Facility: HOSPITAL | Age: 64
LOS: 1 days | Discharge: HOME | End: 2019-08-09
Payer: MEDICARE

## 2019-08-09 VITALS
WEIGHT: 167.55 LBS | TEMPERATURE: 97 F | DIASTOLIC BLOOD PRESSURE: 57 MMHG | SYSTOLIC BLOOD PRESSURE: 114 MMHG | RESPIRATION RATE: 18 BRPM | HEIGHT: 65 IN | OXYGEN SATURATION: 98 % | HEART RATE: 78 BPM

## 2019-08-09 DIAGNOSIS — Z01.818 ENCOUNTER FOR OTHER PREPROCEDURAL EXAMINATION: ICD-10-CM

## 2019-08-09 DIAGNOSIS — K43.6 OTHER AND UNSPECIFIED VENTRAL HERNIA WITH OBSTRUCTION, WITHOUT GANGRENE: ICD-10-CM

## 2019-08-09 DIAGNOSIS — Z98.890 OTHER SPECIFIED POSTPROCEDURAL STATES: Chronic | ICD-10-CM

## 2019-08-09 LAB
ALBUMIN SERPL ELPH-MCNC: 3.6 G/DL — SIGNIFICANT CHANGE UP (ref 3.5–5.2)
ALP SERPL-CCNC: 187 U/L — HIGH (ref 30–115)
ALT FLD-CCNC: 30 U/L — SIGNIFICANT CHANGE UP (ref 0–41)
ANION GAP SERPL CALC-SCNC: 13 MMOL/L — SIGNIFICANT CHANGE UP (ref 7–14)
APPEARANCE UR: ABNORMAL
APTT BLD: 42.3 SEC — HIGH (ref 27–39.2)
AST SERPL-CCNC: 43 U/L — HIGH (ref 0–41)
BACTERIA # UR AUTO: NEGATIVE — SIGNIFICANT CHANGE UP
BASOPHILS # BLD AUTO: 0.08 K/UL — SIGNIFICANT CHANGE UP (ref 0–0.2)
BASOPHILS NFR BLD AUTO: 0.8 % — SIGNIFICANT CHANGE UP (ref 0–1)
BILIRUB SERPL-MCNC: 1.5 MG/DL — HIGH (ref 0.2–1.2)
BILIRUB UR-MCNC: NEGATIVE — SIGNIFICANT CHANGE UP
BUN SERPL-MCNC: 13 MG/DL — SIGNIFICANT CHANGE UP (ref 10–20)
CALCIUM SERPL-MCNC: 9.5 MG/DL — SIGNIFICANT CHANGE UP (ref 8.5–10.1)
CHLORIDE SERPL-SCNC: 105 MMOL/L — SIGNIFICANT CHANGE UP (ref 98–110)
CO2 SERPL-SCNC: 23 MMOL/L — SIGNIFICANT CHANGE UP (ref 17–32)
COLOR SPEC: YELLOW — SIGNIFICANT CHANGE UP
CREAT SERPL-MCNC: 0.7 MG/DL — SIGNIFICANT CHANGE UP (ref 0.7–1.5)
DIFF PNL FLD: NEGATIVE — SIGNIFICANT CHANGE UP
EOSINOPHIL # BLD AUTO: 0.28 K/UL — SIGNIFICANT CHANGE UP (ref 0–0.7)
EOSINOPHIL NFR BLD AUTO: 2.8 % — SIGNIFICANT CHANGE UP (ref 0–8)
EPI CELLS # UR: 5 /HPF — SIGNIFICANT CHANGE UP (ref 0–5)
ESTIMATED AVERAGE GLUCOSE: 137 MG/DL — HIGH (ref 68–114)
GLUCOSE SERPL-MCNC: 128 MG/DL — HIGH (ref 70–99)
GLUCOSE UR QL: NEGATIVE — SIGNIFICANT CHANGE UP
HBA1C BLD-MCNC: 6.4 % — HIGH (ref 4–5.6)
HCT VFR BLD CALC: 48.4 % — HIGH (ref 37–47)
HGB BLD-MCNC: 16.5 G/DL — HIGH (ref 12–16)
HYALINE CASTS # UR AUTO: 1 /LPF — SIGNIFICANT CHANGE UP (ref 0–7)
IMM GRANULOCYTES NFR BLD AUTO: 0.7 % — HIGH (ref 0.1–0.3)
INR BLD: 1.29 RATIO — SIGNIFICANT CHANGE UP (ref 0.65–1.3)
KETONES UR-MCNC: NEGATIVE — SIGNIFICANT CHANGE UP
LEUKOCYTE ESTERASE UR-ACNC: NEGATIVE — SIGNIFICANT CHANGE UP
LYMPHOCYTES # BLD AUTO: 2.1 K/UL — SIGNIFICANT CHANGE UP (ref 1.2–3.4)
LYMPHOCYTES # BLD AUTO: 21.1 % — SIGNIFICANT CHANGE UP (ref 20.5–51.1)
MCHC RBC-ENTMCNC: 33.1 PG — HIGH (ref 27–31)
MCHC RBC-ENTMCNC: 34.1 G/DL — SIGNIFICANT CHANGE UP (ref 32–37)
MCV RBC AUTO: 97 FL — SIGNIFICANT CHANGE UP (ref 81–99)
MONOCYTES # BLD AUTO: 0.77 K/UL — HIGH (ref 0.1–0.6)
MONOCYTES NFR BLD AUTO: 7.8 % — SIGNIFICANT CHANGE UP (ref 1.7–9.3)
NEUTROPHILS # BLD AUTO: 6.63 K/UL — HIGH (ref 1.4–6.5)
NEUTROPHILS NFR BLD AUTO: 66.8 % — SIGNIFICANT CHANGE UP (ref 42.2–75.2)
NITRITE UR-MCNC: NEGATIVE — SIGNIFICANT CHANGE UP
NRBC # BLD: 0 /100 WBCS — SIGNIFICANT CHANGE UP (ref 0–0)
PH UR: 6.5 — SIGNIFICANT CHANGE UP (ref 5–8)
PLATELET # BLD AUTO: 103 K/UL — LOW (ref 130–400)
POTASSIUM SERPL-MCNC: 4.2 MMOL/L — SIGNIFICANT CHANGE UP (ref 3.5–5)
POTASSIUM SERPL-SCNC: 4.2 MMOL/L — SIGNIFICANT CHANGE UP (ref 3.5–5)
PROT SERPL-MCNC: 7 G/DL — SIGNIFICANT CHANGE UP (ref 6–8)
PROT UR-MCNC: SIGNIFICANT CHANGE UP
PROTHROM AB SERPL-ACNC: 14.8 SEC — HIGH (ref 9.95–12.87)
RBC # BLD: 4.99 M/UL — SIGNIFICANT CHANGE UP (ref 4.2–5.4)
RBC # FLD: 14.8 % — HIGH (ref 11.5–14.5)
RBC CASTS # UR COMP ASSIST: 3 /HPF — SIGNIFICANT CHANGE UP (ref 0–4)
SODIUM SERPL-SCNC: 141 MMOL/L — SIGNIFICANT CHANGE UP (ref 135–146)
SP GR SPEC: 1.02 — SIGNIFICANT CHANGE UP (ref 1.01–1.02)
UROBILINOGEN FLD QL: ABNORMAL
WBC # BLD: 9.93 K/UL — SIGNIFICANT CHANGE UP (ref 4.8–10.8)
WBC # FLD AUTO: 9.93 K/UL — SIGNIFICANT CHANGE UP (ref 4.8–10.8)
WBC UR QL: 1 /HPF — SIGNIFICANT CHANGE UP (ref 0–5)

## 2019-08-09 PROCEDURE — 93010 ELECTROCARDIOGRAM REPORT: CPT

## 2019-08-09 RX ORDER — METFORMIN HYDROCHLORIDE 850 MG/1
1 TABLET ORAL
Qty: 0 | Refills: 0 | DISCHARGE

## 2019-08-09 NOTE — H&P PST ADULT - NSICDXPASTMEDICALHX_GEN_ALL_CORE_FT
PAST MEDICAL HISTORY:  COPD (chronic obstructive pulmonary disease)     DM (diabetes mellitus)     Gall stones     HLD (hyperlipidemia)     HTN (hypertension)     Liver cirrhosis PAST MEDICAL HISTORY:  COPD (chronic obstructive pulmonary disease)     DM (diabetes mellitus)     Gall stones     HLD (hyperlipidemia)     HTN (hypertension)     Liver cirrhosis     Thyroid activity decreased +nodules, frequent ultrasound/biopsies & blood tests dr madison bacon [facility]

## 2019-08-09 NOTE — H&P PST ADULT - HISTORY OF PRESENT ILLNESS
64 year old female here for above 64 year old female here for above, pt states + cirrhosis, had ascites in past, now + ventral hernia, needs procedure  pt denies cp, sob or palpitations, + layton after 2 fos secondary to COPD  pt has urinary frequency secondary to diuretic, no urgency or burning  ex gregory 1-2 fos

## 2019-08-09 NOTE — H&P PST ADULT - NSICDXFAMILYHX_GEN_ALL_CORE_FT
FAMILY HISTORY:  Family history of breast cancer in mother  FH: HTN (hypertension), mother  FH: myocardial infarction

## 2019-08-23 ENCOUNTER — APPOINTMENT (OUTPATIENT)
Dept: SURGERY | Facility: AMBULATORY SURGERY CENTER | Age: 64
End: 2019-08-23

## 2019-08-29 ENCOUNTER — APPOINTMENT (OUTPATIENT)
Dept: SURGERY | Facility: CLINIC | Age: 64
End: 2019-08-29

## 2019-09-05 NOTE — ASU PATIENT PROFILE, ADULT - TEACHING/LEARNING FACTORS IMPACT ABILITY TO LEARN
----- Message from Lovely Hancock sent at 2/26/2019  9:06 AM CST -----  Contact: pt  Name of Who is Calling: JOSSELINE CH [[id number]]      What is the request in detail: pt calling to schedule glucose screening.. Please advise      Can the clinic reply by [facility]: no      What Number to Call Back [facility]: [phone]                                       none

## 2019-09-06 ENCOUNTER — OUTPATIENT (OUTPATIENT)
Dept: OUTPATIENT SERVICES | Facility: HOSPITAL | Age: 64
LOS: 1 days | Discharge: HOME | End: 2019-09-06

## 2019-09-06 ENCOUNTER — APPOINTMENT (OUTPATIENT)
Dept: SURGERY | Facility: AMBULATORY SURGERY CENTER | Age: 64
End: 2019-09-06
Payer: COMMERCIAL

## 2019-09-06 VITALS
OXYGEN SATURATION: 97 % | TEMPERATURE: 98 F | DIASTOLIC BLOOD PRESSURE: 59 MMHG | SYSTOLIC BLOOD PRESSURE: 125 MMHG | RESPIRATION RATE: 17 BRPM | HEART RATE: 92 BPM

## 2019-09-06 VITALS
OXYGEN SATURATION: 96 % | RESPIRATION RATE: 18 BRPM | HEIGHT: 65 IN | WEIGHT: 167.55 LBS | HEART RATE: 86 BPM | TEMPERATURE: 98 F | DIASTOLIC BLOOD PRESSURE: 57 MMHG | SYSTOLIC BLOOD PRESSURE: 120 MMHG

## 2019-09-06 DIAGNOSIS — Z98.890 OTHER SPECIFIED POSTPROCEDURAL STATES: Chronic | ICD-10-CM

## 2019-09-06 PROCEDURE — 49568: CPT

## 2019-09-06 PROCEDURE — 49561: CPT

## 2019-09-06 RX ORDER — OXYCODONE AND ACETAMINOPHEN 5; 325 MG/1; MG/1
1 TABLET ORAL ONCE
Refills: 0 | Status: DISCONTINUED | OUTPATIENT
Start: 2019-09-06 | End: 2019-09-06

## 2019-09-06 RX ORDER — HYDROMORPHONE HYDROCHLORIDE 2 MG/ML
0.5 INJECTION INTRAMUSCULAR; INTRAVENOUS; SUBCUTANEOUS
Refills: 0 | Status: DISCONTINUED | OUTPATIENT
Start: 2019-09-06 | End: 2019-09-06

## 2019-09-06 RX ORDER — OMEPRAZOLE 10 MG/1
1 CAPSULE, DELAYED RELEASE ORAL
Qty: 0 | Refills: 0 | DISCHARGE

## 2019-09-06 RX ORDER — ONDANSETRON 8 MG/1
4 TABLET, FILM COATED ORAL ONCE
Refills: 0 | Status: DISCONTINUED | OUTPATIENT
Start: 2019-09-06 | End: 2019-09-21

## 2019-09-06 RX ORDER — SODIUM CHLORIDE 9 MG/ML
1000 INJECTION, SOLUTION INTRAVENOUS
Refills: 0 | Status: DISCONTINUED | OUTPATIENT
Start: 2019-09-06 | End: 2019-09-21

## 2019-09-06 RX ORDER — TRAMADOL HYDROCHLORIDE 50 MG/1
1 TABLET ORAL
Qty: 30 | Refills: 0
Start: 2019-09-06 | End: 2019-09-10

## 2019-09-06 RX ORDER — MORPHINE SULFATE 50 MG/1
2 CAPSULE, EXTENDED RELEASE ORAL
Refills: 0 | Status: DISCONTINUED | OUTPATIENT
Start: 2019-09-06 | End: 2019-09-06

## 2019-09-06 RX ADMIN — SODIUM CHLORIDE 100 MILLILITER(S): 9 INJECTION, SOLUTION INTRAVENOUS at 08:25

## 2019-09-06 NOTE — CHART NOTE - NSCHARTNOTEFT_GEN_A_CORE
PACU ANESTHESIA PACU ADMISSION NOTE      Procedure:Repair of incarcerated ventral hernia with mesh    Post op diagnosisIncarcerated ventral hernia      ____ Intubated  TV:______       Rate: ______      FiO2: ______    __x__ Patent Airway    ____ Full return of protective reflexes    ____ Full recovery from anesthesia / sedation to baseline status    Viitals:  see anesthesia record    Mental Status:  __x__ Awake   _____ Alert   _____ Drowsy   _____ Sedated    Nausea/Vomiting: ____ Yes, See Post - Op Orders      __x__ No    Pain Scale (0-10): _____    Treatment: ____ None    __x__ See Post - Op/PCA Orders    Post - Operative Fluids:   ____ Oral   ___x_ See Post - Op Orders    Plan:         Discharge:   __x__Home       _____Floor         _____Critical Care    _____Other:_________________    Comments: uneventful perioperative course; no s/s anesthesia complications noted' d/c home when criteria met

## 2019-09-06 NOTE — ASU DISCHARGE PLAN (ADULT/PEDIATRIC) - CARE PROVIDER_API CALL
Jayant Goodman)  Surgery  [address], [facility] [address]  [place] [address]  Phone: [phone]  Fax: [phone]  Follow Up Time: 1 week

## 2019-09-10 DIAGNOSIS — F41.9 ANXIETY DISORDER, UNSPECIFIED: ICD-10-CM

## 2019-09-10 DIAGNOSIS — K74.60 UNSPECIFIED CIRRHOSIS OF LIVER: ICD-10-CM

## 2019-09-10 DIAGNOSIS — Z88.1 ALLERGY STATUS TO OTHER ANTIBIOTIC AGENTS STATUS: ICD-10-CM

## 2019-09-10 DIAGNOSIS — K21.9 GASTRO-ESOPHAGEAL REFLUX DISEASE WITHOUT ESOPHAGITIS: ICD-10-CM

## 2019-09-10 DIAGNOSIS — K43.6 OTHER AND UNSPECIFIED VENTRAL HERNIA WITH OBSTRUCTION, WITHOUT GANGRENE: ICD-10-CM

## 2019-09-10 DIAGNOSIS — J44.9 CHRONIC OBSTRUCTIVE PULMONARY DISEASE, UNSPECIFIED: ICD-10-CM

## 2019-09-10 NOTE — ED BEHAVIORAL HEALTH ASSESSMENT NOTE - PATIENT'S CHIEF COMPLAINT
I feel overwhelmed. I cry all the time. I am driving my family crazy."

## 2019-09-17 ENCOUNTER — APPOINTMENT (OUTPATIENT)
Dept: SURGERY | Facility: CLINIC | Age: 64
End: 2019-09-17
Payer: COMMERCIAL

## 2019-09-17 DIAGNOSIS — E11.9 TYPE 2 DIABETES MELLITUS W/OUT COMPLICATIONS: ICD-10-CM

## 2019-09-17 DIAGNOSIS — K43.6 OTHER AND UNSPECIFIED VENTRAL HERNIA WITH OBSTRUCTION, W/OUT GANGRENE: ICD-10-CM

## 2019-09-17 PROCEDURE — 99024 POSTOP FOLLOW-UP VISIT: CPT

## 2019-09-17 NOTE — ASSESSMENT
[FreeTextEntry1] : Татьяна underwent the repair of her incarcerated ventral hernia with mesh on September 6, 2019 under local with IV sedation without any problems or complications. Her wound is clean, dry and intact. There is no evidence of erythema, seroma formation or infection. She is tolerating a diet and having normal bowel movements. She denies any significant postoperative pain or discomfort at this time.\par \par She was counseled and reassured. She was discharged from the office with no specific followup necessary, but she knows to avoid any heavy lifting or strenuous activity for the next several weeks. The patient was encouraged to continue to wear an abdominal binder for the better part of the next month. We also discussed the importance of calorie restriction and healthy eating with regard to weight loss, hernia recurrence and one's overall health.

## 2019-09-17 NOTE — CONSULT LETTER
[FreeTextEntry1] : Dear Dr. Marga Ochoa, \par \par I had the pleasure of seeing your patient, HE BRIZUELA, in my office today. Please see my note below. \par \par Thank you very much for allowing me to participate in the care of this patient. If you have any questions, please do not hesitate to contact me. \par \par \par Respectfully,\par \par Jayant Goodman M.D., FACS\par  \par \par \par cc: Dr. Flako Delgado \par Dr. Delfin Alba

## 2019-11-05 ENCOUNTER — OUTPATIENT (OUTPATIENT)
Dept: OUTPATIENT SERVICES | Facility: HOSPITAL | Age: 64
LOS: 1 days | Discharge: HOME | End: 2019-11-05
Payer: COMMERCIAL

## 2019-11-05 ENCOUNTER — RESULT REVIEW (OUTPATIENT)
Age: 64
End: 2019-11-05

## 2019-11-05 VITALS
HEART RATE: 98 BPM | DIASTOLIC BLOOD PRESSURE: 58 MMHG | RESPIRATION RATE: 20 BRPM | HEIGHT: 65 IN | TEMPERATURE: 98 F | WEIGHT: 179.9 LBS | SYSTOLIC BLOOD PRESSURE: 120 MMHG

## 2019-11-05 VITALS
SYSTOLIC BLOOD PRESSURE: 108 MMHG | RESPIRATION RATE: 18 BRPM | DIASTOLIC BLOOD PRESSURE: 56 MMHG | HEART RATE: 92 BPM | OXYGEN SATURATION: 95 %

## 2019-11-05 DIAGNOSIS — Z98.890 OTHER SPECIFIED POSTPROCEDURAL STATES: Chronic | ICD-10-CM

## 2019-11-05 DIAGNOSIS — Z98.49 CATARACT EXTRACTION STATUS, UNSPECIFIED EYE: Chronic | ICD-10-CM

## 2019-11-05 LAB
ALBUMIN SERPL ELPH-MCNC: 3.3 G/DL — LOW (ref 3.5–5.2)
ALP SERPL-CCNC: 190 U/L — HIGH (ref 30–115)
ALT FLD-CCNC: 28 U/L — SIGNIFICANT CHANGE UP (ref 0–41)
ANION GAP SERPL CALC-SCNC: 12 MMOL/L — SIGNIFICANT CHANGE UP (ref 7–14)
AST SERPL-CCNC: 51 U/L — HIGH (ref 0–41)
B PERT IGG+IGM PNL SER: ABNORMAL
BILIRUB SERPL-MCNC: 2 MG/DL — HIGH (ref 0.2–1.2)
BUN SERPL-MCNC: 10 MG/DL — SIGNIFICANT CHANGE UP (ref 10–20)
CALCIUM SERPL-MCNC: 9.2 MG/DL — SIGNIFICANT CHANGE UP (ref 8.5–10.1)
CHLORIDE SERPL-SCNC: 99 MMOL/L — SIGNIFICANT CHANGE UP (ref 98–110)
CO2 SERPL-SCNC: 27 MMOL/L — SIGNIFICANT CHANGE UP (ref 17–32)
COLOR FLD: SIGNIFICANT CHANGE UP
CREAT SERPL-MCNC: 0.6 MG/DL — LOW (ref 0.7–1.5)
FLUID INTAKE SUBSTANCE CLASS: SIGNIFICANT CHANGE UP
FLUID SEGMENTED GRANULOCYTES: 6 % — SIGNIFICANT CHANGE UP
GLUCOSE SERPL-MCNC: 127 MG/DL — HIGH (ref 70–99)
GRAM STN FLD: SIGNIFICANT CHANGE UP
HCT VFR BLD CALC: 48.2 % — HIGH (ref 37–47)
HGB BLD-MCNC: 16.4 G/DL — HIGH (ref 12–16)
LDH SERPL L TO P-CCNC: 213 — SIGNIFICANT CHANGE UP (ref 50–242)
LYMPHOCYTES # FLD: 51 — SIGNIFICANT CHANGE UP
MCHC RBC-ENTMCNC: 33.5 PG — HIGH (ref 27–31)
MCHC RBC-ENTMCNC: 34 G/DL — SIGNIFICANT CHANGE UP (ref 32–37)
MCV RBC AUTO: 98.6 FL — SIGNIFICANT CHANGE UP (ref 81–99)
MESOTHL CELL # FLD: 30 % — SIGNIFICANT CHANGE UP
MONOS+MACROS # FLD: 13 % — SIGNIFICANT CHANGE UP
NRBC # BLD: 0 /100 WBCS — SIGNIFICANT CHANGE UP (ref 0–0)
PLATELET # BLD AUTO: 126 K/UL — LOW (ref 130–400)
POTASSIUM SERPL-MCNC: 3.6 MMOL/L — SIGNIFICANT CHANGE UP (ref 3.5–5)
POTASSIUM SERPL-SCNC: 3.6 MMOL/L — SIGNIFICANT CHANGE UP (ref 3.5–5)
PROT SERPL-MCNC: 6.9 G/DL — SIGNIFICANT CHANGE UP (ref 6–8)
RBC # BLD: 4.89 M/UL — SIGNIFICANT CHANGE UP (ref 4.2–5.4)
RBC # FLD: 14.3 % — SIGNIFICANT CHANGE UP (ref 11.5–14.5)
RCV VOL RI: 5000 /UL — HIGH (ref 0–5)
SODIUM SERPL-SCNC: 138 MMOL/L — SIGNIFICANT CHANGE UP (ref 135–146)
SPECIMEN SOURCE FLD: SIGNIFICANT CHANGE UP
SPECIMEN SOURCE: SIGNIFICANT CHANGE UP
TOTAL NUCLEATED CELL COUNT, BODY FLUID: 593 /UL — HIGH (ref 0–5)
TUBE TYPE: SIGNIFICANT CHANGE UP
WBC # BLD: 10.79 K/UL — SIGNIFICANT CHANGE UP (ref 4.8–10.8)
WBC # FLD AUTO: 10.79 K/UL — SIGNIFICANT CHANGE UP (ref 4.8–10.8)

## 2019-11-05 PROCEDURE — 71045 X-RAY EXAM CHEST 1 VIEW: CPT | Mod: 26

## 2019-11-05 PROCEDURE — 88305 TISSUE EXAM BY PATHOLOGIST: CPT | Mod: 26

## 2019-11-05 PROCEDURE — 88112 CYTOPATH CELL ENHANCE TECH: CPT | Mod: 26

## 2019-11-05 NOTE — ASU PATIENT PROFILE, ADULT - PSH
H/O umbilical hernia repair    History of cataract surgery  left eye  History of cataract surgery  right eye  History of surgery  HYSTERECTOMY, LEFT CATARACT, LEFT KNEE SURGERY

## 2019-11-05 NOTE — ASU DISCHARGE PLAN (ADULT/PEDIATRIC) - CARE PROVIDER_API CALL
Delfin Alba)  Critical Care Medicine; Pulmonary Disease; Sleep Medicine  [address] [place] [address]  Phone: [phone]  Fax: [phone]  Follow Up Time: 1 week

## 2019-11-05 NOTE — H&P ADULT - ASSESSMENT
IMPRESSION:  64 year old F here for left thoracentesis    Plan:  follow up pleural fluid studies  follow up in clinic in 1-2 weeks

## 2019-11-05 NOTE — PROCEDURE NOTE - NSPROCDETAILS_GEN_ALL_CORE
location identified, draped/prepped, sterile technique used, needle inserted/introduced/Seldinger technique/connection to syringe/connection to evacuated glass bottle/catheter inserted over needle Seldinger technique/catheter inserted over needle/ultrasound assessment of effusion (localization)/ultrasound assessment of effusion (size)/connection to evacuated glass bottle/connection to syringe/location identified, draped/prepped, sterile technique used, needle inserted/introduced

## 2019-11-05 NOTE — H&P ADULT - NSHPPHYSICALEXAM_GEN_ALL_CORE
PHYSICAL EXAM:    T(F): 97.8, Max: 97.8 (11-05-19 @ 06:56)  HR: 98  BP: 120/58  BP(mean): --  RR: 20  SpO2: --  GENERAL: NAD, well-developed  HEAD:  Atraumatic, Normocephalic  EYES: EOMI, PERRLA, conjunctiva and sclera clear  NECK: Supple, No JVD  CHEST/LUNG: decreased breath sounds over left lung base  HEART: Regular rate and rhythm; No murmurs, rubs, or gallops  ABDOMEN: Soft, Nontender, + distention.  EXTREMITIES:  2+ Peripheral Pulses, No clubbing, cyanosis, or edema  PSYCH: AAOx3  NEUROLOGY: non-focal  SKIN: No rashes or lesions

## 2019-11-06 LAB
ALBUMIN FLD-MCNC: 1.2 G/DL — SIGNIFICANT CHANGE UP
AMYLASE FLD-CCNC: 23 U/L — SIGNIFICANT CHANGE UP
GLUCOSE FLD-MCNC: 169 MG/DL — SIGNIFICANT CHANGE UP
LDH SERPL L TO P-CCNC: 149 U/L — SIGNIFICANT CHANGE UP
NON-GYNECOLOGICAL CYTOLOGY STUDY: SIGNIFICANT CHANGE UP
PROT FLD-MCNC: 2.3 G/DL — SIGNIFICANT CHANGE UP
SPECIMEN SOURCE FLD: SIGNIFICANT CHANGE UP

## 2019-11-10 LAB
CULTURE RESULTS: SIGNIFICANT CHANGE UP
SPECIMEN SOURCE: SIGNIFICANT CHANGE UP

## 2019-11-11 DIAGNOSIS — E11.9 TYPE 2 DIABETES MELLITUS WITHOUT COMPLICATIONS: ICD-10-CM

## 2019-11-11 DIAGNOSIS — J44.9 CHRONIC OBSTRUCTIVE PULMONARY DISEASE, UNSPECIFIED: ICD-10-CM

## 2019-11-11 DIAGNOSIS — J90 PLEURAL EFFUSION, NOT ELSEWHERE CLASSIFIED: ICD-10-CM

## 2019-11-11 DIAGNOSIS — Z88.0 ALLERGY STATUS TO PENICILLIN: ICD-10-CM

## 2019-11-11 DIAGNOSIS — I10 ESSENTIAL (PRIMARY) HYPERTENSION: ICD-10-CM

## 2019-11-11 DIAGNOSIS — E78.00 PURE HYPERCHOLESTEROLEMIA, UNSPECIFIED: ICD-10-CM

## 2019-11-11 DIAGNOSIS — K74.60 UNSPECIFIED CIRRHOSIS OF LIVER: ICD-10-CM

## 2019-12-04 LAB
CULTURE RESULTS: SIGNIFICANT CHANGE UP
SPECIMEN SOURCE: SIGNIFICANT CHANGE UP

## 2019-12-09 ENCOUNTER — RESULT REVIEW (OUTPATIENT)
Age: 64
End: 2019-12-09

## 2019-12-09 ENCOUNTER — OUTPATIENT (OUTPATIENT)
Dept: OUTPATIENT SERVICES | Facility: HOSPITAL | Age: 64
LOS: 1 days | Discharge: HOME | End: 2019-12-09
Payer: COMMERCIAL

## 2019-12-09 DIAGNOSIS — Z98.890 OTHER SPECIFIED POSTPROCEDURAL STATES: Chronic | ICD-10-CM

## 2019-12-09 DIAGNOSIS — Z98.49 CATARACT EXTRACTION STATUS, UNSPECIFIED EYE: Chronic | ICD-10-CM

## 2019-12-09 PROCEDURE — 88342 IMHCHEM/IMCYTCHM 1ST ANTB: CPT | Mod: 26,59

## 2019-12-09 PROCEDURE — 88305 TISSUE EXAM BY PATHOLOGIST: CPT | Mod: 26

## 2019-12-09 PROCEDURE — 88344 IMHCHEM/IMCYTCHM EA MLT ANTB: CPT | Mod: 26

## 2019-12-09 PROCEDURE — 49083 ABD PARACENTESIS W/IMAGING: CPT

## 2019-12-09 PROCEDURE — 32555 ASPIRATE PLEURA W/ IMAGING: CPT | Mod: LT

## 2019-12-09 PROCEDURE — 88341 IMHCHEM/IMCYTCHM EA ADD ANTB: CPT | Mod: 26

## 2019-12-09 PROCEDURE — 88112 CYTOPATH CELL ENHANCE TECH: CPT | Mod: 26

## 2019-12-09 PROCEDURE — 71045 X-RAY EXAM CHEST 1 VIEW: CPT | Mod: 26

## 2019-12-09 NOTE — PROGRESS NOTE ADULT - SUBJECTIVE AND OBJECTIVE BOX
INTERVENTIONAL RADIOLOGY BRIEF-OPERATIVE NOTE    Procedure:  Image guided left thoracentesis    Pre-Op Diagnosis: Left pleural effusion    Post-Op Diagnosis: same    Attending: Melissa  Resident: none    Anesthesia (type):  [ ] General Anesthesia  [x ] Sedation  [ ] Spinal Anesthesia  [ x] Local/Regional    Contrast: None    Estimated Blood Loss: Minimal, < 5 cc    Condition:   [ ] Critical  [ ] Serious  [ ] Fair   [ x] Good    Findings/Follow up Plan of Care:  see full report in pacs    Specimens Removed: none    Implants: none    Complications: none    Disposition: Recovery      Please call Interventional Radiology [phone] with any questions, concerns, or issues.

## 2019-12-09 NOTE — PROGRESS NOTE ADULT - SUBJECTIVE AND OBJECTIVE BOX
PREOPERATIVE DAY OF PROCEDURE EVALUATION:     I have personally seen and examined this patient. I agree with the history and physical which I have reviewed and noted any changes below:     Plan is for image guided paracentesis with albumin today 12/9    Procedure/ risks/ benefits/ goals/ alternatives were explained. All questions answered. Informed content obtained from patient. Consent placed in chart.

## 2019-12-09 NOTE — PROGRESS NOTE ADULT - SUBJECTIVE AND OBJECTIVE BOX
Interventional Radiology Brief- Operative Note: Paracentesis    Procedure: right sided image guided paracentesis with albumin    Pre-Op Diagnosis: cirrhosis with recurrent ascites     Post-Op Diagnosis: same     Provider: Joanne Davenport PA-C    Anesthesia (type):  [ ] General Anesthesia  [ ] Sedation  [ ] Spinal Anesthesia  [ x ] Local/Regional    Contrast: none    Estimated Blood Loss: <5mL    Condition:   [ ] Critical  [ ] Serious  [ ] Fair   [ x ] Good    Findings/Follow up Plan of Care: 5.6L of serous fluid removed, 25% albumin in 50cc solution administered x2    Specimens Removed: 120cc removed and sent to lab for testing    Implants: none    Complications: none    Condition/Disposition: monitor vitals Q10 x 2, d/c home    Discharge instructions: resume diet and activity as tolerated, resume medications as needed, keep dressing clean and dry - remove after 2 days, follow up paracentesis as needed    Please call Interventional Radiology [phone] with any questions, concerns, or issues.

## 2019-12-12 DIAGNOSIS — J90 PLEURAL EFFUSION, NOT ELSEWHERE CLASSIFIED: ICD-10-CM

## 2019-12-12 DIAGNOSIS — R18.8 OTHER ASCITES: ICD-10-CM

## 2019-12-12 DIAGNOSIS — K74.60 UNSPECIFIED CIRRHOSIS OF LIVER: ICD-10-CM

## 2019-12-12 NOTE — ED PROVIDER NOTE - NORMAL, MLM
INITIAL ORTHOPAEDIC NOTE    HISTORY OF PRESENT ILLNESS: Joyce Olea is a 62 year old female presenting for evaluation of chronic intermittent bilateral hip pain. Her pain is generally lateral. Right is worse than her left. She's had this on and off for at least 8 years. She has been through therapy to stretch the IT band. She's had multiple trochanteric injections over the years which have typically been quite helpful. She thinks her last injections were about 5 years ago. She reports some pain if she lies on to the sides at night. Denies groin pain. Typically no issues with sit to stand or stairs. She does have intermittent low back pain. There is no pain below the knee and no paresthesias. She has a history of previous right rotator cuff repair about 10 years ago. She does smoke, no history of diabetes.    Past Medical History:   Diagnosis Date   • Anxiety Disorder    • History of smoking    • Osteoarthritis    • Overweight(278.02)     BMI 34     No past surgical history on file.  Social History     Tobacco Use   • Smoking status: Current Every Day Smoker     Packs/day: 1.00     Years: 40.00     Pack years: 40.00   • Smokeless tobacco: Never Used   Substance Use Topics   • Alcohol use: No   • Drug use: No     Current Outpatient Medications   Medication Sig   • naproxen (NAPROSYN) 500 MG tablet TAKE 1 TABLET BY MOUTH TWICE DAILY WITH MEALS.   • magnesium 250 MG tablet Take 250 mg by mouth daily.   • albuterol 108 (90 Base) MCG/ACT inhaler INHALE 2 PUFFS INTO THE LUNGS EVERY 4 HOURS AS NEEDED FOR SHORTNESS OF BREATH OR WHEEZING   • topiramate (TOPAMAX) 50 MG tablet TAKE 1 TABLET BY MOUTH NIGHTLY   • gabapentin (NEURONTIN) 300 MG capsule TAKE 1 CAPSULE BY MOUTH AT NIGHT   • pravastatin (PRAVACHOL) 20 MG tablet TAKE 1 TABLET BY MOUTH DAILY   • hydrochlorothiazide (HYDRODIURIL) 25 MG tablet TAKE 1/2 TABLET BY MOUTH DAILY   • ALPRAZolam (XANAX) 0.25 MG tablet TAKE 1 TABLET BY MOUTH AT BEDTIME AS NEEDED FOR SLEEP  OR ANXIETY   • DULoxetine (CYMBALTA) 60 MG capsule TAKE 1 CAPSULE BY MOUTH DAILY   • cholecalciferol (VITAMIN D3) 1000 UNITS tablet Take 1,000 Units by mouth daily.     No current facility-administered medications for this visit.      ALLERGIES:   Allergen Reactions   • Meloxicam DIZZINESS and Other (See Comments)     Pt states that made her sleepy       REVIEW OF SYSTEMS:  Constitutional:  Denies fever or chills  Cardiovascular:  Denies chest pain or edema  Gastrointestinal:  Denies abdominal pain, nausea, vomiting, bloody stools or diarrhea with naproxen  Musculoskeletal:  See History of Present Illness  Integument:  Denies rash  Neurologic:  Denies headache, focal weakness or sensory changes  Psychiatric:  Denies depression or anxiety    PHYSICAL EXAM:  Visit Vitals  Ht 5' 4\" (1.626 m)   Wt 80.7 kg   BMI 30.54 kg/m²     General:  Well groomed, in no apparent distress.  HEENT:  Eyes normal, pupils equal and round (PER)  Extremities:  No cyanosis, clubbing, or edema.  Skin:  No abnormal skin lesions.  Neurologic:  Alert and oriented x4. Alert and cooperative. Reflexes 2+ and symmetrical throughout except for any deficits listed below. Normal strength and sensation except for any deficits listed below.  Musculoskeletal: There is no pain with sit to stand. She has valgus deformity of the knees bilaterally, more pronounced on the left. She has pain over the trochanters bilaterally, more pronounced over the right. Some pain over the distal IT band on the right. No pain at the lumbosacral junction, sciatic notch or ischial tuberosity. Painless lumbosacral motion. Painless hip range of motion. She has some pain with resisted abduction, perhaps some mild weakness on the right. Straight leg raise negative. Intact posterior tibial pulses.    LAB: None    X-RAY INTERPRETATION: Bilateral hip x-rays show mild degenerative changes. Dystrophic calcification over the trochanters bilaterally    IMPRESSION/DIAGNOSIS: Trochanteric  bursitis/abductor pathology right greater than left    TREATMENT AND RECOMMENDATIONS: Recommended trochanteric injection on the right. Would also recommend a MRI the right hip to evaluate for abductor tendon tear. With her consent, the area of maximal tenderness of the right hip was prepped with Betadine and injected using 5 cc 1% lidocaine and 80 mg of Depo-Medrol. Follow-up once the MRI is completed to discuss further management options.         reina all pertinent systems normal

## 2019-12-18 ENCOUNTER — OUTPATIENT (OUTPATIENT)
Dept: OUTPATIENT SERVICES | Facility: HOSPITAL | Age: 64
LOS: 1 days | Discharge: HOME | End: 2019-12-18
Payer: COMMERCIAL

## 2019-12-18 ENCOUNTER — RESULT REVIEW (OUTPATIENT)
Age: 64
End: 2019-12-18

## 2019-12-18 DIAGNOSIS — Z98.890 OTHER SPECIFIED POSTPROCEDURAL STATES: Chronic | ICD-10-CM

## 2019-12-18 DIAGNOSIS — Z98.49 CATARACT EXTRACTION STATUS, UNSPECIFIED EYE: Chronic | ICD-10-CM

## 2019-12-18 PROCEDURE — 49083 ABD PARACENTESIS W/IMAGING: CPT

## 2019-12-18 PROCEDURE — 88305 TISSUE EXAM BY PATHOLOGIST: CPT | Mod: 26

## 2019-12-18 PROCEDURE — 88112 CYTOPATH CELL ENHANCE TECH: CPT | Mod: 26

## 2019-12-18 PROCEDURE — 71045 X-RAY EXAM CHEST 1 VIEW: CPT | Mod: 26

## 2019-12-18 NOTE — PROGRESS NOTE ADULT - SUBJECTIVE AND OBJECTIVE BOX
PREOPERATIVE DAY OF PROCEDURE EVALUATION:     I have personally seen and examined this patient. I agree with the history and physical which I have reviewed and noted any changes below:     Plan is for image guided paracentesis and thoracentesis today 12/18    Procedure/ risks/ benefits/ goals/ alternatives were explained. All questions answered. Informed content obtained from patient. Consent placed in chart.

## 2019-12-18 NOTE — PROGRESS NOTE ADULT - SUBJECTIVE AND OBJECTIVE BOX
Interventional Radiology Brief- Operative Note: Paracentesis    Procedure: right sided image guided paracentesis with albumin     Pre-Op Diagnosis: cirrhosis with recurrent ascites     Post-Op Diagnosis: same     Provider: Joanne Davenport PA-C    Anesthesia (type):  [ ] General Anesthesia  [ ] Sedation  [ ] Spinal Anesthesia  [ x ] Local/Regional    Contrast: none    Estimated Blood Loss: <5mL    Condition:   [ ] Critical  [ ] Serious  [ ] Fair   [ x ] Good    Findings/Follow up Plan of Care: 6.4L of serous fluid removed, 25% albumin in 50cc solution administered x2    Specimens Removed: 120cc fluid removed and sent to lab for testing     Implants: none    Complications: none    Condition/Disposition: monitor vitals Q10 x 2, d/c home     Discharge instructions: resume diet and activity as tolerated, resume medications as needed, keep dressing clean and dry - remove after 2 days, follow up paracentesis as needed    Please call Interventional Radiology [phone] with any questions, concerns, or issues.

## 2019-12-18 NOTE — PROGRESS NOTE ADULT - SUBJECTIVE AND OBJECTIVE BOX
Interventional Radiology Outpatient Documentation    POSTOPERATIVE PROCEDURAL EVALUATION:     Procedure: image guided left sided thoracentesis     Pre-Op Diagnosis: recurrent pleural effusion     Post-Op Diagnosis: same     Attending: Melissa  Performing Provider: KAROLYN Davenport    Anesthesia (type):  [ ] General Anesthesia  [ x] Sedation  [ ] Spinal Anesthesia  [ x] Local/Regional    Contrast: None    Estimated Blood Loss: Minimal, < 5 cc    Condition:   [ ] Critical  [ ] Serious  [ ] Fair   [ x ] Good    Findings/Follow up Plan of Care: 1.7L of serous fluid removed     See full report in pacs    Specimens Removed: None    Implants: None    Complications: None    Disposition: Recovery

## 2019-12-24 DIAGNOSIS — E05.90 THYROTOXICOSIS, UNSPECIFIED WITHOUT THYROTOXIC CRISIS OR STORM: ICD-10-CM

## 2019-12-24 DIAGNOSIS — Z90.710 ACQUIRED ABSENCE OF BOTH CERVIX AND UTERUS: ICD-10-CM

## 2019-12-24 DIAGNOSIS — R18.8 OTHER ASCITES: ICD-10-CM

## 2019-12-24 DIAGNOSIS — K74.60 UNSPECIFIED CIRRHOSIS OF LIVER: ICD-10-CM

## 2019-12-24 DIAGNOSIS — E78.5 HYPERLIPIDEMIA, UNSPECIFIED: ICD-10-CM

## 2019-12-24 DIAGNOSIS — J90 PLEURAL EFFUSION, NOT ELSEWHERE CLASSIFIED: ICD-10-CM

## 2019-12-27 ENCOUNTER — OUTPATIENT (OUTPATIENT)
Dept: OUTPATIENT SERVICES | Facility: HOSPITAL | Age: 64
LOS: 1 days | Discharge: HOME | End: 2019-12-27
Payer: COMMERCIAL

## 2019-12-27 VITALS
WEIGHT: 176.37 LBS | SYSTOLIC BLOOD PRESSURE: 100 MMHG | TEMPERATURE: 97 F | DIASTOLIC BLOOD PRESSURE: 60 MMHG | HEART RATE: 114 BPM | RESPIRATION RATE: 16 BRPM | OXYGEN SATURATION: 97 % | HEIGHT: 65 IN

## 2019-12-27 DIAGNOSIS — K74.60 UNSPECIFIED CIRRHOSIS OF LIVER: ICD-10-CM

## 2019-12-27 DIAGNOSIS — Z98.890 OTHER SPECIFIED POSTPROCEDURAL STATES: Chronic | ICD-10-CM

## 2019-12-27 DIAGNOSIS — Z01.818 ENCOUNTER FOR OTHER PREPROCEDURAL EXAMINATION: ICD-10-CM

## 2019-12-27 DIAGNOSIS — Z98.49 CATARACT EXTRACTION STATUS, UNSPECIFIED EYE: Chronic | ICD-10-CM

## 2019-12-27 LAB
ALBUMIN SERPL ELPH-MCNC: 3.4 G/DL — LOW (ref 3.5–5.2)
ALP SERPL-CCNC: 196 U/L — HIGH (ref 30–115)
ALT FLD-CCNC: 35 U/L — SIGNIFICANT CHANGE UP (ref 0–41)
ANION GAP SERPL CALC-SCNC: 20 MMOL/L — HIGH (ref 7–14)
APTT BLD: 33.2 SEC — SIGNIFICANT CHANGE UP (ref 27–39.2)
AST SERPL-CCNC: 65 U/L — HIGH (ref 0–41)
BASOPHILS # BLD AUTO: 0.09 K/UL — SIGNIFICANT CHANGE UP (ref 0–0.2)
BASOPHILS NFR BLD AUTO: 0.7 % — SIGNIFICANT CHANGE UP (ref 0–1)
BILIRUB SERPL-MCNC: 3 MG/DL — HIGH (ref 0.2–1.2)
BUN SERPL-MCNC: 17 MG/DL — SIGNIFICANT CHANGE UP (ref 10–20)
CALCIUM SERPL-MCNC: 9.2 MG/DL — SIGNIFICANT CHANGE UP (ref 8.5–10.1)
CHLORIDE SERPL-SCNC: 95 MMOL/L — LOW (ref 98–110)
CO2 SERPL-SCNC: 21 MMOL/L — SIGNIFICANT CHANGE UP (ref 17–32)
CREAT SERPL-MCNC: 0.8 MG/DL — SIGNIFICANT CHANGE UP (ref 0.7–1.5)
EOSINOPHIL # BLD AUTO: 0.14 K/UL — SIGNIFICANT CHANGE UP (ref 0–0.7)
EOSINOPHIL NFR BLD AUTO: 1.2 % — SIGNIFICANT CHANGE UP (ref 0–8)
ESTIMATED AVERAGE GLUCOSE: 120 MG/DL — HIGH (ref 68–114)
GLUCOSE SERPL-MCNC: 136 MG/DL — HIGH (ref 70–99)
HBA1C BLD-MCNC: 5.8 % — HIGH (ref 4–5.6)
HCT VFR BLD CALC: 48.3 % — HIGH (ref 37–47)
HGB BLD-MCNC: 16.8 G/DL — HIGH (ref 12–16)
IMM GRANULOCYTES NFR BLD AUTO: 1.3 % — HIGH (ref 0.1–0.3)
INR BLD: 1.31 RATIO — HIGH (ref 0.65–1.3)
LYMPHOCYTES # BLD AUTO: 1.48 K/UL — SIGNIFICANT CHANGE UP (ref 1.2–3.4)
LYMPHOCYTES # BLD AUTO: 12.2 % — LOW (ref 20.5–51.1)
MCHC RBC-ENTMCNC: 33.9 PG — HIGH (ref 27–31)
MCHC RBC-ENTMCNC: 34.8 G/DL — SIGNIFICANT CHANGE UP (ref 32–37)
MCV RBC AUTO: 97.4 FL — SIGNIFICANT CHANGE UP (ref 81–99)
MONOCYTES # BLD AUTO: 1.07 K/UL — HIGH (ref 0.1–0.6)
MONOCYTES NFR BLD AUTO: 8.8 % — SIGNIFICANT CHANGE UP (ref 1.7–9.3)
NEUTROPHILS # BLD AUTO: 9.16 K/UL — HIGH (ref 1.4–6.5)
NEUTROPHILS NFR BLD AUTO: 75.8 % — HIGH (ref 42.2–75.2)
NRBC # BLD: 0 /100 WBCS — SIGNIFICANT CHANGE UP (ref 0–0)
PLATELET # BLD AUTO: 156 K/UL — SIGNIFICANT CHANGE UP (ref 130–400)
POTASSIUM SERPL-MCNC: 3.8 MMOL/L — SIGNIFICANT CHANGE UP (ref 3.5–5)
POTASSIUM SERPL-SCNC: 3.8 MMOL/L — SIGNIFICANT CHANGE UP (ref 3.5–5)
PROT SERPL-MCNC: 6.9 G/DL — SIGNIFICANT CHANGE UP (ref 6–8)
PROTHROM AB SERPL-ACNC: 15 SEC — HIGH (ref 9.95–12.87)
RBC # BLD: 4.96 M/UL — SIGNIFICANT CHANGE UP (ref 4.2–5.4)
RBC # FLD: 15.8 % — HIGH (ref 11.5–14.5)
SODIUM SERPL-SCNC: 136 MMOL/L — SIGNIFICANT CHANGE UP (ref 135–146)
WBC # BLD: 12.1 K/UL — HIGH (ref 4.8–10.8)
WBC # FLD AUTO: 12.1 K/UL — HIGH (ref 4.8–10.8)

## 2019-12-27 PROCEDURE — 49083 ABD PARACENTESIS W/IMAGING: CPT

## 2019-12-27 PROCEDURE — 93010 ELECTROCARDIOGRAM REPORT: CPT

## 2019-12-27 PROCEDURE — 32555 ASPIRATE PLEURA W/ IMAGING: CPT | Mod: LT

## 2019-12-27 RX ORDER — MULTIVIT WITH MIN/MFOLATE/K2 340-15/3 G
2 POWDER (GRAM) ORAL
Qty: 0 | Refills: 0 | DISCHARGE

## 2019-12-27 NOTE — H&P PST ADULT - NSICDXPASTSURGICALHX_GEN_ALL_CORE_FT
PAST SURGICAL HISTORY:  H/O umbilical hernia repair     History of cataract surgery right eye    History of cataract surgery left eye    History of surgery HYSTERECTOMY, LEFT CATARACT, LEFT KNEE SURGERY

## 2019-12-27 NOTE — H&P PST ADULT - NSICDXPASTMEDICALHX_GEN_ALL_CORE_FT
PAST MEDICAL HISTORY:  Anxiety     COPD (chronic obstructive pulmonary disease)     DM (diabetes mellitus)     Gall stones     HLD (hyperlipidemia)     HTN (hypertension)     Hyperthyroidism no meds, per pt dr wallace is "testing me for graves disease"    Liver cirrhosis

## 2019-12-27 NOTE — H&P PST ADULT - REASON FOR ADMISSION
63 yo presents w/ hx "cirrhosis, I am filling up w/ fluid, I get tapped& am getting tapped again today, they are going to put in a stent to hopefully help"  denies chest pain, palpitations, shortness of breath, dyspnea, or dysuria. exercise tolerance: 1 blocks/ flights of stairs w/o sob;  pt hr 114, pt state is anxious, took klonopin in past, advised klonopin dop 65 yo presents w/ hx "cirrhosis, I am filling up w/ fluid, I get tapped& am getting tapped again today, they are going to put in a stent to hopefully help", pt is scheduled for TIPS procedure  denies chest pain, palpitations, shortness of breath, dyspnea, or dysuria. exercise tolerance: 1 blocks/ flights of stairs w/o sob;  pt hr 114, pt state is anxious, took klonopin in past, advised klonopin dop

## 2019-12-27 NOTE — PROGRESS NOTE ADULT - SUBJECTIVE AND OBJECTIVE BOX
INTERVENTIONAL RADIOLOGY BRIEF-OPERATIVE NOTE    Procedure: Ultrasound-guided paracentesis and left thoracentesis    Pre-Op Diagnosis: Ascites    Post-Op Diagnosis: Same    Attending: Ranjeet East MD  Resident: Arthur Stiles MD    Anesthesia (type):  [ ] General Anesthesia  [ ] Sedation  [ ] Spinal Anesthesia  [x] Local/Regional    Contrast: None    Estimated Blood Loss: Minimal, < 5 cc    Condition:   [ ] Critical  [ ] Serious  [ ] Fair   [x] Good    Findings/Follow up Plan of Care: Ultrasound-guided paracentesis with removal of 6.2 L alma fluid. Total of 150cc of 25% albumin administered. Ultrasound-guided left thoracentesis with removal of 1.6 L of straw-colored fluid. Patient began coughing at this point and aspiration was therefore stopped to mitigate risk for reexpansion edema.    Specimens Removed: As above    Implants: None.    Complications: None immediate.    Disposition: Monitoring in recovery unit prior to discharge home if hemodynamically stable and no respiratory symptoms.      Please call Interventional Radiology [phone] with any questions, concerns, or issues.

## 2019-12-27 NOTE — H&P PST ADULT - TEACHING/LEARNING FACTORS IMPACT ABILITY TO LEARN
Please take atorvastatin instead of simvastatin for cholesterol to avoid problems with amlodipine
none

## 2019-12-28 LAB
T4 AB SER-ACNC: 7.9 UG/DL — SIGNIFICANT CHANGE UP (ref 4.6–12)
TSH SERPL-MCNC: 0.9 UIU/ML — SIGNIFICANT CHANGE UP (ref 0.27–4.2)

## 2019-12-31 PROBLEM — E05.90 THYROTOXICOSIS, UNSPECIFIED WITHOUT THYROTOXIC CRISIS OR STORM: Chronic | Status: ACTIVE | Noted: 2019-12-27

## 2019-12-31 PROBLEM — F41.9 ANXIETY DISORDER, UNSPECIFIED: Chronic | Status: ACTIVE | Noted: 2019-12-27

## 2020-01-07 ENCOUNTER — INPATIENT (INPATIENT)
Facility: HOSPITAL | Age: 65
LOS: 0 days | Discharge: HOME | End: 2020-01-08
Attending: INTERNAL MEDICINE | Admitting: INTERNAL MEDICINE
Payer: COMMERCIAL

## 2020-01-07 VITALS
OXYGEN SATURATION: 98 % | WEIGHT: 163.36 LBS | TEMPERATURE: 98 F | DIASTOLIC BLOOD PRESSURE: 50 MMHG | HEART RATE: 100 BPM | HEIGHT: 65 IN | SYSTOLIC BLOOD PRESSURE: 90 MMHG

## 2020-01-07 DIAGNOSIS — Z98.49 CATARACT EXTRACTION STATUS, UNSPECIFIED EYE: Chronic | ICD-10-CM

## 2020-01-07 DIAGNOSIS — Z88.0 ALLERGY STATUS TO PENICILLIN: ICD-10-CM

## 2020-01-07 DIAGNOSIS — K74.60 UNSPECIFIED CIRRHOSIS OF LIVER: ICD-10-CM

## 2020-01-07 DIAGNOSIS — Z98.890 OTHER SPECIFIED POSTPROCEDURAL STATES: Chronic | ICD-10-CM

## 2020-01-07 DIAGNOSIS — J90 PLEURAL EFFUSION, NOT ELSEWHERE CLASSIFIED: ICD-10-CM

## 2020-01-07 DIAGNOSIS — F41.9 ANXIETY DISORDER, UNSPECIFIED: ICD-10-CM

## 2020-01-07 DIAGNOSIS — Z88.1 ALLERGY STATUS TO OTHER ANTIBIOTIC AGENTS STATUS: ICD-10-CM

## 2020-01-07 DIAGNOSIS — R18.8 OTHER ASCITES: ICD-10-CM

## 2020-01-07 DIAGNOSIS — E05.90 THYROTOXICOSIS, UNSPECIFIED WITHOUT THYROTOXIC CRISIS OR STORM: ICD-10-CM

## 2020-01-07 DIAGNOSIS — E11.9 TYPE 2 DIABETES MELLITUS WITHOUT COMPLICATIONS: ICD-10-CM

## 2020-01-07 DIAGNOSIS — E78.5 HYPERLIPIDEMIA, UNSPECIFIED: ICD-10-CM

## 2020-01-07 DIAGNOSIS — I10 ESSENTIAL (PRIMARY) HYPERTENSION: ICD-10-CM

## 2020-01-07 DIAGNOSIS — J44.9 CHRONIC OBSTRUCTIVE PULMONARY DISEASE, UNSPECIFIED: ICD-10-CM

## 2020-01-07 LAB
GLUCOSE BLDC GLUCOMTR-MCNC: 112 MG/DL — HIGH (ref 70–99)
GLUCOSE BLDC GLUCOMTR-MCNC: 121 MG/DL — HIGH (ref 70–99)

## 2020-01-07 PROCEDURE — 32552 REMOVE LUNG CATHETER: CPT | Mod: LT

## 2020-01-07 PROCEDURE — 76937 US GUIDE VASCULAR ACCESS: CPT | Mod: 26

## 2020-01-07 PROCEDURE — 49083 ABD PARACENTESIS W/IMAGING: CPT

## 2020-01-07 PROCEDURE — 71045 X-RAY EXAM CHEST 1 VIEW: CPT | Mod: 26

## 2020-01-07 PROCEDURE — 37182 INSERT HEPATIC SHUNT (TIPS): CPT

## 2020-01-07 RX ORDER — METFORMIN HYDROCHLORIDE 850 MG/1
0 TABLET ORAL
Qty: 0 | Refills: 0 | DISCHARGE

## 2020-01-07 RX ORDER — SODIUM CHLORIDE 9 MG/ML
1000 INJECTION, SOLUTION INTRAVENOUS
Refills: 0 | Status: DISCONTINUED | OUTPATIENT
Start: 2020-01-07 | End: 2020-01-07

## 2020-01-07 RX ORDER — ATORVASTATIN CALCIUM 80 MG/1
40 TABLET, FILM COATED ORAL AT BEDTIME
Refills: 0 | Status: DISCONTINUED | OUTPATIENT
Start: 2020-01-07 | End: 2020-01-08

## 2020-01-07 RX ORDER — ASCORBIC ACID 60 MG
500 TABLET,CHEWABLE ORAL DAILY
Refills: 0 | Status: DISCONTINUED | OUTPATIENT
Start: 2020-01-07 | End: 2020-01-08

## 2020-01-07 RX ORDER — SPIRONOLACTONE 25 MG/1
25 TABLET, FILM COATED ORAL DAILY
Refills: 0 | Status: DISCONTINUED | OUTPATIENT
Start: 2020-01-07 | End: 2020-01-08

## 2020-01-07 RX ORDER — CLONAZEPAM 1 MG
1 TABLET ORAL
Qty: 0 | Refills: 0 | DISCHARGE

## 2020-01-07 RX ORDER — FUROSEMIDE 40 MG
0 TABLET ORAL
Qty: 0 | Refills: 0 | DISCHARGE

## 2020-01-07 RX ORDER — FUROSEMIDE 40 MG
1 TABLET ORAL
Qty: 0 | Refills: 0 | DISCHARGE

## 2020-01-07 RX ORDER — ATORVASTATIN CALCIUM 80 MG/1
1 TABLET, FILM COATED ORAL
Qty: 0 | Refills: 0 | DISCHARGE

## 2020-01-07 RX ORDER — MAGNESIUM GLYCINATE 100 MG
2 CAPSULE ORAL
Qty: 0 | Refills: 0 | DISCHARGE

## 2020-01-07 RX ORDER — CLONAZEPAM 1 MG
0.5 TABLET ORAL DAILY
Refills: 0 | Status: DISCONTINUED | OUTPATIENT
Start: 2020-01-07 | End: 2020-01-08

## 2020-01-07 RX ORDER — TIOTROPIUM BROMIDE 18 UG/1
1 CAPSULE ORAL; RESPIRATORY (INHALATION) AT BEDTIME
Refills: 0 | Status: DISCONTINUED | OUTPATIENT
Start: 2020-01-07 | End: 2020-01-08

## 2020-01-07 RX ORDER — SENNA PLUS 8.6 MG/1
2 TABLET ORAL AT BEDTIME
Refills: 0 | Status: DISCONTINUED | OUTPATIENT
Start: 2020-01-07 | End: 2020-01-08

## 2020-01-07 RX ORDER — FUROSEMIDE 40 MG
20 TABLET ORAL
Refills: 0 | Status: DISCONTINUED | OUTPATIENT
Start: 2020-01-07 | End: 2020-01-08

## 2020-01-07 RX ORDER — ALBUMIN HUMAN 25 %
50 VIAL (ML) INTRAVENOUS ONCE
Refills: 0 | Status: DISCONTINUED | OUTPATIENT
Start: 2020-01-07 | End: 2020-01-08

## 2020-01-07 RX ORDER — FLUTICASONE FUROATE AND VILANTEROL TRIFENATATE 100; 25 UG/1; UG/1
1 POWDER RESPIRATORY (INHALATION)
Qty: 0 | Refills: 0 | DISCHARGE

## 2020-01-07 RX ORDER — MORPHINE SULFATE 50 MG/1
1 CAPSULE, EXTENDED RELEASE ORAL
Refills: 0 | Status: DISCONTINUED | OUTPATIENT
Start: 2020-01-07 | End: 2020-01-08

## 2020-01-07 RX ORDER — ONDANSETRON 8 MG/1
4 TABLET, FILM COATED ORAL ONCE
Refills: 0 | Status: COMPLETED | OUTPATIENT
Start: 2020-01-07 | End: 2020-01-08

## 2020-01-07 RX ORDER — INFLUENZA VIRUS VACCINE 15; 15; 15; 15 UG/.5ML; UG/.5ML; UG/.5ML; UG/.5ML
0.5 SUSPENSION INTRAMUSCULAR ONCE
Refills: 0 | Status: DISCONTINUED | OUTPATIENT
Start: 2020-01-07 | End: 2020-01-08

## 2020-01-07 RX ORDER — IPRATROPIUM/ALBUTEROL SULFATE 18-103MCG
3 AEROSOL WITH ADAPTER (GRAM) INHALATION EVERY 6 HOURS
Refills: 0 | Status: DISCONTINUED | OUTPATIENT
Start: 2020-01-07 | End: 2020-01-08

## 2020-01-07 RX ORDER — MORPHINE SULFATE 50 MG/1
4 CAPSULE, EXTENDED RELEASE ORAL
Refills: 0 | Status: DISCONTINUED | OUTPATIENT
Start: 2020-01-07 | End: 2020-01-08

## 2020-01-07 RX ORDER — CEFTRIAXONE 500 MG/1
1000 INJECTION, POWDER, FOR SOLUTION INTRAMUSCULAR; INTRAVENOUS ONCE
Refills: 0 | Status: COMPLETED | OUTPATIENT
Start: 2020-01-07 | End: 2020-01-07

## 2020-01-07 RX ORDER — PANTOPRAZOLE SODIUM 20 MG/1
40 TABLET, DELAYED RELEASE ORAL
Refills: 0 | Status: DISCONTINUED | OUTPATIENT
Start: 2020-01-07 | End: 2020-01-08

## 2020-01-07 RX ORDER — BUDESONIDE AND FORMOTEROL FUMARATE DIHYDRATE 160; 4.5 UG/1; UG/1
2 AEROSOL RESPIRATORY (INHALATION)
Refills: 0 | Status: DISCONTINUED | OUTPATIENT
Start: 2020-01-07 | End: 2020-01-08

## 2020-01-07 RX ORDER — TIOTROPIUM BROMIDE 18 UG/1
1 CAPSULE ORAL; RESPIRATORY (INHALATION)
Qty: 0 | Refills: 0 | DISCHARGE

## 2020-01-07 RX ORDER — HEPARIN SODIUM 5000 [USP'U]/ML
5000 INJECTION INTRAVENOUS; SUBCUTANEOUS EVERY 12 HOURS
Refills: 0 | Status: DISCONTINUED | OUTPATIENT
Start: 2020-01-07 | End: 2020-01-08

## 2020-01-07 RX ORDER — CHLORHEXIDINE GLUCONATE 213 G/1000ML
1 SOLUTION TOPICAL
Refills: 0 | Status: DISCONTINUED | OUTPATIENT
Start: 2020-01-07 | End: 2020-01-08

## 2020-01-07 RX ORDER — METFORMIN HYDROCHLORIDE 850 MG/1
1 TABLET ORAL
Qty: 0 | Refills: 0 | DISCHARGE

## 2020-01-07 RX ORDER — SPIRONOLACTONE 25 MG/1
1 TABLET, FILM COATED ORAL
Qty: 0 | Refills: 0 | DISCHARGE

## 2020-01-07 RX ORDER — ASCORBIC ACID 60 MG
1 TABLET,CHEWABLE ORAL
Qty: 0 | Refills: 0 | DISCHARGE

## 2020-01-07 RX ADMIN — HEPARIN SODIUM 5000 UNIT(S): 5000 INJECTION INTRAVENOUS; SUBCUTANEOUS at 18:29

## 2020-01-07 RX ADMIN — Medication 20 MILLIGRAM(S): at 22:51

## 2020-01-07 RX ADMIN — CEFTRIAXONE 100 MILLIGRAM(S): 500 INJECTION, POWDER, FOR SOLUTION INTRAMUSCULAR; INTRAVENOUS at 18:29

## 2020-01-07 RX ADMIN — Medication 20 MILLIGRAM(S): at 18:29

## 2020-01-07 RX ADMIN — ATORVASTATIN CALCIUM 40 MILLIGRAM(S): 80 TABLET, FILM COATED ORAL at 21:33

## 2020-01-07 NOTE — PROGRESS NOTE ADULT - SUBJECTIVE AND OBJECTIVE BOX
INTERVENTIONAL RADIOLOGY BRIEF-OPERATIVE NOTE    Procedure:    1. Left thoracentesis  2. Paracentesis  3. TIPS stent placement    Pre-Op Diagnosis: Refractory ascites, Cirrhosis    Post-Op Diagnosis: same     Attending: Melissa  Resident: none    Anesthesia (type):  [x ] General Anesthesia  [ ] Sedation  [ ] Spinal Anesthesia  [ x] Local/Regional    Contrast: 50 cc    Estimated Blood Loss: Minimal, < 5 cc    Condition:   [ ] Critical  [ ] Serious  [ x] Fair   [  ] Good    Findings/Follow up Plan of Care:    1. Left thoracentesis - 1.5 L removed  2. Paracentesis - 5 L removed  3. Pressures      Right atrial pressure pre: 6 mmHg      Wedge intrahepatic pressure: 36 mmHg      Portosystemic gradient : 30 mm Hg       Portal pressure post stent: 16 mmHg       TIPS stent pressure: 5 mmHg      Right atrial pressure post: 8 mm Hg   4. Successful TIPS placement via right hepatic vein to right portal vein     Patient accepted to ICU/Critical Care - [phone]  Monitor overnight for signs of bleeding    Specimens Removed: None    Implants: Viatorr 8-10 mm x 8 cm covered x 2 cm uncovered     Complications: None    Disposition: Recovery       Please call Interventional Radiology [phone] with any questions, concerns, or issues.

## 2020-01-07 NOTE — PRE-ANESTHESIA EVALUATION ADULT - NSANTHPMHFT_GEN_ALL_CORE
TSH and T4 normal. lungs decreased breath sound , no ronchi, SOB possibly from pl effusion planned to remove fluid from pl space and ascities. DM with neuropathy

## 2020-01-07 NOTE — PROGRESS NOTE ADULT - SUBJECTIVE AND OBJECTIVE BOX
PREOPERATIVE DAY OF PROCEDURE EVALUATION:     I have personally seen and examined this patient. I agree with the history and physical which I have reviewed and noted any changes below:     Plan is for TIPS stent and left thoracentesis and paracentesis with anesthesia     Procedure/ risks/ benefits/ goals/ alternatives were explained. All questions answered. Informed content obtained from patient. Consent placed in chart.

## 2020-01-07 NOTE — H&P ADULT - NSHPPHYSICALEXAM_GEN_ALL_CORE
GENERAL: NAD, lying in bed comfortably, lethargic but arousable  HEAD: Atraumatic, Normocephalic  EYES: EOMI, PERRLA, conjunctiva pink and cornea white  ENT: Normal external ears and nose, no discharges; moist mucous membranes, no erythema on posterior oropharynx  NECK: Supple, tender to palpation on right neck due to recent surgery site, no JVD  CHEST/LUNG: Decreased breath sounds bibasilar  HEART: Regular rate and rhythm; No murmurs, rubs, or gallops  ABDOMEN: Distended abdomen, soft, nontender with positive fluid wave; no rebound tenderness, no guarding; normoactive bowel sounds  EXTREMITIES:  2+ Peripheral Pulses, brisk capillary refill. No clubbing, cyanosis, or petal edema  NERVOUS SYSTEM: Alert and oriented to person, time, place and situation, slow speech. No focal deficits   MSK: FROM all 4 extremities, full and equal strength  SKIN: No rashes or lesions

## 2020-01-07 NOTE — H&P ADULT - NSHPSOCIALHISTORY_GEN_ALL_CORE
Active smoker, 1/2 PPD for 40 years  Alcohol abstinence for 1 year  Denies illicit drug use      Lives at home with family, functionally independent

## 2020-01-07 NOTE — PATIENT PROFILE ADULT - SURGICAL SITE DESCRIPTION
right groin puncture, right jugular puncture, left thorascentisis puncture, left paracentisis puncture

## 2020-01-07 NOTE — H&P ADULT - HISTORY OF PRESENT ILLNESS
64 years old female from home with alcoholic cirrhosis with refractory ascites (diagnosed 1 year ago, undergoes paracentesis every 2 weeks), COPD not on home O2, DM, HTN, DLD, presented to the hospital for elective TIPS procedure for refractory ascites. Patient also had left thoracentesis and paracentesis today. A total of 1.5L of pleural fluid and 5L of ascites was removed. The procedures were uneventful. Patient was successfully extubated and saturating well on NC. Patient felt lethargic after coming out from anesthesia. Patient reports chronic cough with some sputum, but no change from baseline. Patient denies recent fever/chills, chest pain, shortness 64 years old female from home with alcoholic cirrhosis with refractory ascites (diagnosed 1 year ago, undergoes paracentesis every 2 weeks), COPD not on home O2, DM, HTN, DLD, anxiety, presented to the hospital for elective TIPS procedure for refractory ascites. Patient also had left thoracentesis and paracentesis today. A total of 1.5L of pleural fluid and 5L of ascites was removed. The procedures were uneventful. Patient was successfully extubated and saturating well on NC. Patient felt lethargic after coming out from anesthesia. Patient reports chronic cough with some sputum, but no change from baseline. Patient denies recent fever/chills, chest pain, shortness of breath, abdominal pain, nausea/vomiting, diarrhea/constipation, or urinary symptoms. 64 years old female from home with alcoholic cirrhosis with refractory ascites (diagnosed 1 year ago, undergoes paracentesis every 2 weeks), COPD not on home O2, DM, HTN, DLD, anxiety, presented to the hospital for elective TIPS procedure for refractory ascites. Patient also had left thoracentesis and paracentesis today. A total of 1.6L of pleural fluid and 6.2L of ascites was removed. The procedures were uneventful. Patient was successfully extubated and saturating well on NC. Patient felt lethargic after coming out from anesthesia. Patient reports chronic cough with some sputum, but no change from baseline. Patient denies recent fever/chills, chest pain, shortness of breath, abdominal pain, nausea/vomiting, diarrhea/constipation, or urinary symptoms.

## 2020-01-08 ENCOUNTER — TRANSCRIPTION ENCOUNTER (OUTPATIENT)
Age: 65
End: 2020-01-08

## 2020-01-08 VITALS
SYSTOLIC BLOOD PRESSURE: 100 MMHG | OXYGEN SATURATION: 95 % | HEART RATE: 98 BPM | DIASTOLIC BLOOD PRESSURE: 57 MMHG | RESPIRATION RATE: 21 BRPM

## 2020-01-08 DIAGNOSIS — E78.5 HYPERLIPIDEMIA, UNSPECIFIED: ICD-10-CM

## 2020-01-08 DIAGNOSIS — J44.9 CHRONIC OBSTRUCTIVE PULMONARY DISEASE, UNSPECIFIED: ICD-10-CM

## 2020-01-08 DIAGNOSIS — F41.9 ANXIETY DISORDER, UNSPECIFIED: ICD-10-CM

## 2020-01-08 DIAGNOSIS — E11.9 TYPE 2 DIABETES MELLITUS WITHOUT COMPLICATIONS: ICD-10-CM

## 2020-01-08 LAB
ALBUMIN SERPL ELPH-MCNC: 2.7 G/DL — LOW (ref 3.5–5.2)
ALP SERPL-CCNC: 132 U/L — HIGH (ref 30–115)
ALT FLD-CCNC: 47 U/L — HIGH (ref 0–41)
ANION GAP SERPL CALC-SCNC: 13 MMOL/L — SIGNIFICANT CHANGE UP (ref 7–14)
APPEARANCE UR: CLEAR — SIGNIFICANT CHANGE UP
AST SERPL-CCNC: 110 U/L — HIGH (ref 0–41)
BACTERIA # UR AUTO: NEGATIVE — SIGNIFICANT CHANGE UP
BASOPHILS # BLD AUTO: 0.07 K/UL — SIGNIFICANT CHANGE UP (ref 0–0.2)
BASOPHILS NFR BLD AUTO: 0.6 % — SIGNIFICANT CHANGE UP (ref 0–1)
BILIRUB SERPL-MCNC: 3.1 MG/DL — HIGH (ref 0.2–1.2)
BILIRUB UR-MCNC: NEGATIVE — SIGNIFICANT CHANGE UP
BUN SERPL-MCNC: 10 MG/DL — SIGNIFICANT CHANGE UP (ref 10–20)
CALCIUM SERPL-MCNC: 8.3 MG/DL — LOW (ref 8.5–10.1)
CHLORIDE SERPL-SCNC: 101 MMOL/L — SIGNIFICANT CHANGE UP (ref 98–110)
CO2 SERPL-SCNC: 23 MMOL/L — SIGNIFICANT CHANGE UP (ref 17–32)
COLOR SPEC: YELLOW — SIGNIFICANT CHANGE UP
CREAT SERPL-MCNC: 0.6 MG/DL — LOW (ref 0.7–1.5)
DIFF PNL FLD: ABNORMAL
EOSINOPHIL # BLD AUTO: 0.26 K/UL — SIGNIFICANT CHANGE UP (ref 0–0.7)
EOSINOPHIL NFR BLD AUTO: 2.4 % — SIGNIFICANT CHANGE UP (ref 0–8)
EPI CELLS # UR: 6 /HPF — HIGH (ref 0–5)
GLUCOSE BLDC GLUCOMTR-MCNC: 134 MG/DL — HIGH (ref 70–99)
GLUCOSE SERPL-MCNC: 125 MG/DL — HIGH (ref 70–99)
GLUCOSE UR QL: NEGATIVE — SIGNIFICANT CHANGE UP
HCT VFR BLD CALC: 40.7 % — SIGNIFICANT CHANGE UP (ref 37–47)
HGB BLD-MCNC: 14.1 G/DL — SIGNIFICANT CHANGE UP (ref 12–16)
HYALINE CASTS # UR AUTO: 1 /LPF — SIGNIFICANT CHANGE UP (ref 0–7)
IMM GRANULOCYTES NFR BLD AUTO: 1.5 % — HIGH (ref 0.1–0.3)
KETONES UR-MCNC: NEGATIVE — SIGNIFICANT CHANGE UP
LEUKOCYTE ESTERASE UR-ACNC: NEGATIVE — SIGNIFICANT CHANGE UP
LYMPHOCYTES # BLD AUTO: 0.7 K/UL — LOW (ref 1.2–3.4)
LYMPHOCYTES # BLD AUTO: 6.5 % — LOW (ref 20.5–51.1)
MAGNESIUM SERPL-MCNC: 1.4 MG/DL — LOW (ref 1.8–2.4)
MCHC RBC-ENTMCNC: 33.7 PG — HIGH (ref 27–31)
MCHC RBC-ENTMCNC: 34.6 G/DL — SIGNIFICANT CHANGE UP (ref 32–37)
MCV RBC AUTO: 97.4 FL — SIGNIFICANT CHANGE UP (ref 81–99)
MONOCYTES # BLD AUTO: 1.12 K/UL — HIGH (ref 0.1–0.6)
MONOCYTES NFR BLD AUTO: 10.4 % — HIGH (ref 1.7–9.3)
NEUTROPHILS # BLD AUTO: 8.48 K/UL — HIGH (ref 1.4–6.5)
NEUTROPHILS NFR BLD AUTO: 78.6 % — HIGH (ref 42.2–75.2)
NITRITE UR-MCNC: NEGATIVE — SIGNIFICANT CHANGE UP
NRBC # BLD: 0 /100 WBCS — SIGNIFICANT CHANGE UP (ref 0–0)
PH UR: 6.5 — SIGNIFICANT CHANGE UP (ref 5–8)
PHOSPHATE SERPL-MCNC: 3.1 MG/DL — SIGNIFICANT CHANGE UP (ref 2.1–4.9)
PLATELET # BLD AUTO: 86 K/UL — LOW (ref 130–400)
POTASSIUM SERPL-MCNC: 3.7 MMOL/L — SIGNIFICANT CHANGE UP (ref 3.5–5)
POTASSIUM SERPL-SCNC: 3.7 MMOL/L — SIGNIFICANT CHANGE UP (ref 3.5–5)
PROT SERPL-MCNC: 5.2 G/DL — LOW (ref 6–8)
PROT UR-MCNC: ABNORMAL
RBC # BLD: 4.18 M/UL — LOW (ref 4.2–5.4)
RBC # FLD: 15.7 % — HIGH (ref 11.5–14.5)
RBC CASTS # UR COMP ASSIST: 4 /HPF — SIGNIFICANT CHANGE UP (ref 0–4)
SODIUM SERPL-SCNC: 137 MMOL/L — SIGNIFICANT CHANGE UP (ref 135–146)
SP GR SPEC: 1.05 — HIGH (ref 1.01–1.02)
UROBILINOGEN FLD QL: SIGNIFICANT CHANGE UP
WBC # BLD: 10.79 K/UL — SIGNIFICANT CHANGE UP (ref 4.8–10.8)
WBC # FLD AUTO: 10.79 K/UL — SIGNIFICANT CHANGE UP (ref 4.8–10.8)
WBC UR QL: 2 /HPF — SIGNIFICANT CHANGE UP (ref 0–5)

## 2020-01-08 RX ORDER — METRONIDAZOLE 500 MG
500 TABLET ORAL EVERY 8 HOURS
Refills: 0 | Status: DISCONTINUED | OUTPATIENT
Start: 2020-01-08 | End: 2020-01-08

## 2020-01-08 RX ORDER — LACTULOSE 10 G/15ML
45 SOLUTION ORAL
Qty: 3000 | Refills: 0
Start: 2020-01-08 | End: 2020-02-06

## 2020-01-08 RX ORDER — SODIUM CHLORIDE 9 MG/ML
500 INJECTION INTRAMUSCULAR; INTRAVENOUS; SUBCUTANEOUS ONCE
Refills: 0 | Status: COMPLETED | OUTPATIENT
Start: 2020-01-08 | End: 2020-01-08

## 2020-01-08 RX ORDER — LACTULOSE 10 G/15ML
30 SOLUTION ORAL
Refills: 0 | Status: DISCONTINUED | OUTPATIENT
Start: 2020-01-08 | End: 2020-01-08

## 2020-01-08 RX ORDER — METRONIDAZOLE 500 MG
1 TABLET ORAL
Qty: 15 | Refills: 0
Start: 2020-01-08 | End: 2020-01-12

## 2020-01-08 RX ORDER — CIPROFLOXACIN LACTATE 400MG/40ML
1 VIAL (ML) INTRAVENOUS
Qty: 10 | Refills: 0
Start: 2020-01-08 | End: 2020-01-12

## 2020-01-08 RX ORDER — MAGNESIUM SULFATE 500 MG/ML
2 VIAL (ML) INJECTION EVERY 6 HOURS
Refills: 0 | Status: COMPLETED | OUTPATIENT
Start: 2020-01-08 | End: 2020-01-08

## 2020-01-08 RX ORDER — ACETAMINOPHEN 500 MG
650 TABLET ORAL ONCE
Refills: 0 | Status: COMPLETED | OUTPATIENT
Start: 2020-01-08 | End: 2020-01-08

## 2020-01-08 RX ORDER — MORPHINE SULFATE 50 MG/1
5 CAPSULE, EXTENDED RELEASE ORAL EVERY 6 HOURS
Refills: 0 | Status: DISCONTINUED | OUTPATIENT
Start: 2020-01-08 | End: 2020-01-08

## 2020-01-08 RX ORDER — SENNA PLUS 8.6 MG/1
2 TABLET ORAL
Qty: 0 | Refills: 0 | DISCHARGE
Start: 2020-01-08

## 2020-01-08 RX ORDER — CIPROFLOXACIN LACTATE 400MG/40ML
500 VIAL (ML) INTRAVENOUS EVERY 12 HOURS
Refills: 0 | Status: DISCONTINUED | OUTPATIENT
Start: 2020-01-08 | End: 2020-01-08

## 2020-01-08 RX ADMIN — SPIRONOLACTONE 25 MILLIGRAM(S): 25 TABLET, FILM COATED ORAL at 06:05

## 2020-01-08 RX ADMIN — PANTOPRAZOLE SODIUM 40 MILLIGRAM(S): 20 TABLET, DELAYED RELEASE ORAL at 06:06

## 2020-01-08 RX ADMIN — SODIUM CHLORIDE 666.67 MILLILITER(S): 9 INJECTION INTRAMUSCULAR; INTRAVENOUS; SUBCUTANEOUS at 17:00

## 2020-01-08 RX ADMIN — MORPHINE SULFATE 4 MILLIGRAM(S): 50 CAPSULE, EXTENDED RELEASE ORAL at 04:11

## 2020-01-08 RX ADMIN — Medication 25 GRAM(S): at 11:56

## 2020-01-08 RX ADMIN — SODIUM CHLORIDE 1000 MILLILITER(S): 9 INJECTION INTRAMUSCULAR; INTRAVENOUS; SUBCUTANEOUS at 18:10

## 2020-01-08 RX ADMIN — Medication 500 MILLIGRAM(S): at 13:33

## 2020-01-08 RX ADMIN — MORPHINE SULFATE 4 MILLIGRAM(S): 50 CAPSULE, EXTENDED RELEASE ORAL at 04:18

## 2020-01-08 RX ADMIN — LACTULOSE 30 GRAM(S): 10 SOLUTION ORAL at 18:30

## 2020-01-08 RX ADMIN — Medication 20 MILLIGRAM(S): at 11:55

## 2020-01-08 RX ADMIN — MORPHINE SULFATE 4 MILLIGRAM(S): 50 CAPSULE, EXTENDED RELEASE ORAL at 00:04

## 2020-01-08 RX ADMIN — Medication 20 MILLIGRAM(S): at 06:05

## 2020-01-08 RX ADMIN — CHLORHEXIDINE GLUCONATE 1 APPLICATION(S): 213 SOLUTION TOPICAL at 05:32

## 2020-01-08 RX ADMIN — Medication 500 MILLIGRAM(S): at 11:55

## 2020-01-08 RX ADMIN — Medication 25 GRAM(S): at 09:47

## 2020-01-08 RX ADMIN — HEPARIN SODIUM 5000 UNIT(S): 5000 INJECTION INTRAVENOUS; SUBCUTANEOUS at 18:25

## 2020-01-08 RX ADMIN — ONDANSETRON 4 MILLIGRAM(S): 8 TABLET, FILM COATED ORAL at 13:37

## 2020-01-08 RX ADMIN — Medication 0.5 MILLIGRAM(S): at 11:56

## 2020-01-08 RX ADMIN — HEPARIN SODIUM 5000 UNIT(S): 5000 INJECTION INTRAVENOUS; SUBCUTANEOUS at 05:20

## 2020-01-08 RX ADMIN — Medication 650 MILLIGRAM(S): at 12:13

## 2020-01-08 NOTE — PROGRESS NOTE ADULT - SUBJECTIVE AND OBJECTIVE BOX
Patient is a 64y old  Female who presents with a chief complaint of Post TIPS procedure (2020 16:24)    Over Night Events:         ROS:     CONSTITUTIONAL:   no fever   no chills.  no weight gain   no weight loss    EYES:   no discharge,   no pain  no redness,   no visual changes.    ENT:   Ears: no ear pain and no hearing problems.  Nose: no nasal congestion and no nasal drainage.  Mouth/Throat: no dysphagia,  no hoarseness and no throat pain.  Neck: no lumps, no pain, no stiffness and no swollen glands.     CARDIOVASCULAR:   no chest pain,   no swelling  no palpitaions  no syncope    RESPIRATORY:  no SOB,  no wheezing ,  no respiratory difficulty  no sputum production    GASTROINTESTINAL:   no abdominal pain,   no constipation,   no diarrhea,   no vomiting.    GENITOURINARY:  no dysuria,   no frequency,   no urgency  no hematuria.    MUSCULOSKELETAL:   no back pain,   no musculoskeletal pain,  no weakness.    SKIN:   no jaundice,   no lesions,   no pruritis,   no rashes.    NEURO:   no loss of consciousness,   no gait abnormality,   no headache,   no sensory deficits,   no weakness.    PSYCHIATRIC:   no known mental health issues  no anxiety  no depression    ALLERGIC/IMMUNOLOGIC:   No active allergic or immunologic issues        PHYSICAL EXAM    ICU Vital Signs Last 24 Hrs  T(C): 36.5 (2020 08:00), Max: 36.9 (2020 18:00)  T(F): 97.7 (2020 08:00), Max: 98.4 (2020 18:00)  HR: 108 (2020 08:00) (92 - 112)  BP: 83/46 (2020 08:00) (75/46 - 114/59)  BP(mean): 58 (2020 08:00) (56 - 85)  ABP: --  ABP(mean): --  RR: 15 (2020 08:00) (10 - 31)  SpO2: 91% (2020 08:00) (90% - 100%)      CONSTITUTIONAL:   Ill appearing.  Well nourished.  NAD    ENT:   Airway patent,   Mouth with normal mucosa.   No thrush    CARDIAC:   Normal rate,   Regular rhythm.     No edema      RESPIRATORY:   NO wheezing  Bilateral BS  Normal chest expansion  Not tachypneic,  No use of accessory muscles    GASTROINTESTINAL:  Abdomen soft,   Non-tender,   No guarding,   + BS    MUSCULOSKELETAL:   Range of motion is not limited,  No clubbing, cyanosis    NEUROLOGICAL:   Alert and oriented   No motor  deficits.  pertinent DTRs normal    SKIN:   Skin normal color for race,   warm, dry   No evidence of rash.        20 @ 07:01  -  20 @ 07:00  --------------------------------------------------------  IN:    IV PiggyBack: 50 mL    Oral Fluid: 510 mL  Total IN: 560 mL    OUT:    Voided: 700 mL  Total OUT: 700 mL    Total NET: -140 mL      20 @ 07:  -  20 @ 09:58  --------------------------------------------------------  IN:  Total IN: 0 mL    OUT:    Voided: 100 mL  Total OUT: 100 mL    Total NET: -100 mL          LABS:                            14.1   10.79 )-----------( 86       ( 2020 05:08 )             40.7                                               01-08    137  |  101  |  10  ----------------------------<  125<H>  3.7   |  23  |  0.6<L>    Ca    8.3<L>      2020 05:08  Phos  3.1     -  Mg     1.4         TPro  5.2<L>  /  Alb  2.7<L>  /  TBili  3.1<H>  /  DBili  x   /  AST  110<H>  /  ALT  47<H>  /  AlkPhos  132<H>                                               Urinalysis Basic - ( 2020 04:09 )    Color: Yellow / Appearance: Clear / S.047 / pH: x  Gluc: x / Ketone: Negative  / Bili: Negative / Urobili: <2 mg/dL   Blood: x / Protein: 30 mg/dL / Nitrite: Negative   Leuk Esterase: Negative / RBC: 4 /HPF / WBC 2 /HPF   Sq Epi: x / Non Sq Epi: 6 /HPF / Bacteria: Negative                                                  LIVER FUNCTIONS - ( 2020 05:08 )  Alb: 2.7 g/dL / Pro: 5.2 g/dL / ALK PHOS: 132 U/L / ALT: 47 U/L / AST: 110 U/L / GGT: x                                                                                                                                       MEDICATIONS  (STANDING):  albumin human 25% IVPB 50 milliLiter(s) IV Intermittent once  albumin human 25% IVPB 50 milliLiter(s) IV Intermittent once  albumin human 25% IVPB 50 milliLiter(s) IV Intermittent once  ascorbic acid 500 milliGRAM(s) Oral daily  atorvastatin 40 milliGRAM(s) Oral at bedtime  budesonide 160 MICROgram(s)/formoterol 4.5 MICROgram(s) Inhaler 2 Puff(s) Inhalation two times a day  chlorhexidine 4% Liquid 1 Application(s) Topical <User Schedule>  clonazePAM  Tablet 0.5 milliGRAM(s) Oral daily  furosemide    Tablet 20 milliGRAM(s) Oral two times a day  heparin  Injectable 5000 Unit(s) SubCutaneous every 12 hours  influenza   Vaccine 0.5 milliLiter(s) IntraMuscular once  lactulose Syrup 30 Gram(s) Oral two times a day  magnesium sulfate  IVPB 2 Gram(s) IV Intermittent every 6 hours  pantoprazole    Tablet 40 milliGRAM(s) Oral before breakfast  PARoxetine 20 milliGRAM(s) Oral daily  senna 2 Tablet(s) Oral at bedtime  spironolactone 25 milliGRAM(s) Oral daily  tiotropium 18 MICROgram(s) Capsule 1 Capsule(s) Inhalation at bedtime    MEDICATIONS  (PRN):  albuterol/ipratropium for Nebulization 3 milliLiter(s) Nebulizer every 6 hours PRN Shortness of Breath and/or Wheezing  morphine   Solution 5 milliGRAM(s) Oral every 6 hours PRN Moderate Pain (4 - 6)  ondansetron Injectable 4 milliGRAM(s) IV Push once PRN Nausea and/or Vomiting      New X-rays reviewed:                                                                                  ECHO    CXR interpreted by me:  no pneumo, no infiltrate

## 2020-01-08 NOTE — DISCHARGE NOTE PROVIDER - NSDCMRMEDTOKEN_GEN_ALL_CORE_FT
Breo Ellipta 200 mcg-25 mcg/inh inhalation powder: 1 puff(s) inhaled once a day  ciprofloxacin 500 mg oral tablet: 1 tab(s) orally 2 times a day   esomeprazole 40 mg oral delayed release capsule: 1 cap(s) orally once a day, As Needed  Flagyl 500 mg oral tablet: 1 tab(s) orally 3 times a day   furosemide 20 mg oral tablet: 1 tab(s) orally 2 times a day  KlonoPIN 0.5 mg oral tablet: 1 tab(s) orally once a day  lactulose 10 g/15 mL oral syrup: 45 milliliter(s) orally 2 times a day  Lipitor 40 mg oral tablet: 1 tab(s) orally once a day  magnesium glycinate 200 mg oral tablet: 2 tab(s) orally once a day (after a meal)  metFORMIN 1000 mg oral tablet, extended release: 1 tab(s) orally 2 times a day  Paxil 20 mg oral tablet: 1 tab(s) orally once a day (at bedtime)  senna oral tablet: 2 tab(s) orally once a day (at bedtime)  Spiriva 18 mcg inhalation capsule: 1 cap(s) inhaled once a day (at bedtime)  spironolactone 25 mg oral tablet: 1 tab(s) orally once a day  Vitamin C 500 mg oral tablet: 1 tab(s) orally once a day

## 2020-01-08 NOTE — CHART NOTE - NSCHARTNOTEFT_GEN_A_CORE
s/p TIPS procedure with IR on 1/7 for refractory ascites secondary to alcoholic cirrhosis.  Tolerated procedure well and monitored in ICU for 24 hours.  C/o mild abdominal pain, being given morphine for pain control.  With low grade fever, tachycardia.    Patient to be d/archana today and follow up with hepatologist Dr. Delgado in 1 week.    ICU Vital Signs Last 24 Hrs  T(C): 38 (08 Jan 2020 12:00), Max: 38 (08 Jan 2020 12:00)  T(F): 100.4 (08 Jan 2020 12:00), Max: 100.4 (08 Jan 2020 12:00)  HR: 106 (08 Jan 2020 12:00) (92 - 112)  BP: 94/47 (08 Jan 2020 12:00) (75/46 - 114/59)  BP(mean): 59 (08 Jan 2020 12:00) (56 - 85)  ABP: --  ABP(mean): --  RR: 21 (08 Jan 2020 12:00) (10 - 31)  SpO2: 97% (08 Jan 2020 12:00) (90% - 100%)    T(C): 38 (01-08-20 @ 12:00), Max: 38 (01-08-20 @ 12:00)  HR: 106 (01-08-20 @ 12:00) (92 - 112)  BP: 94/47 (01-08-20 @ 12:00) (75/46 - 114/59)  RR: 21 (01-08-20 @ 12:00) (10 - 31)  SpO2: 97% (01-08-20 @ 12:00) (90% - 100%)    PHYSICAL EXAM:  GENERAL: NAD  HEAD:  Atraumatic, Normocephalic  EYES: EOMI, PERRLA, conjunctiva and sclera clear  ENT:No nasal obstruction or discharge. No tonsillar exudate, swelling or erythema.  NECK: Supple, No JVD  CHEST/LUNG: Clear to auscultation bilaterally; No wheeze  HEART: Regular rate and rhythm; No murmurs, rubs, or gallops  ABDOMEN: Soft, mildly tender, distended. no rigidity/guarding.  EXTREMITIES:  2+ Peripheral Pulses, No clubbing, cyanosis, or edema  PSYCH: AAOx3  NEUROLOGY: non-focal  SKIN: No rashes or lesions

## 2020-01-08 NOTE — DISCHARGE NOTE NURSING/CASE MANAGEMENT/SOCIAL WORK - PATIENT PORTAL LINK FT
You can access the FollowMyHealth Patient Portal offered by [facility] by registering at the following website: [email or web address]. By joining [employer]’s FollowMyHealth portal, you will also be able to view your health information using other applications (apps) compatible with our system.

## 2020-01-08 NOTE — DISCHARGE NOTE NURSING/CASE MANAGEMENT/SOCIAL WORK - NSDCPEEMAIL_GEN_ALL_CORE
[facility] for Tobacco Control email [email or web address]

## 2020-01-08 NOTE — DISCHARGE NOTE PROVIDER - HOSPITAL COURSE
64 years old female from home with alcoholic cirrhosis with refractory ascites (diagnosed 1 year ago, undergoes paracentesis every 2 weeks), COPD not on home O2, DM, HTN, DLD, anxiety, presented to the hospital for elective TIPS procedure for refractory ascites. Patient also had left thoracentesis and paracentesis today. A total of 1.6L of pleural fluid and 6.2L of ascites was removed. The procedures were uneventful. Patient was successfully extubated and saturating well on NC. Patient was admitted to the ICU post procedure to 24 hour monitoring.  Patient was given albumin and continued on lasix and aldactone.  Pain controlled with morphine.  Course in the ICU was stable and patient cleared for discharge 64 years old female from home with alcoholic cirrhosis with refractory ascites (diagnosed 1 year ago, undergoes paracentesis every 2 weeks), COPD not on home O2, DM, HTN, DLD, anxiety, presented to the hospital for elective TIPS procedure for refractory ascites. Patient also had left thoracentesis and paracentesis today. A total of 1.6L of pleural fluid and 6.2L of ascites was removed. The procedures were uneventful. Patient was successfully extubated and saturating well on NC. Patient was admitted to the ICU post procedure to 24 hour monitoring.  Patient was given albumin and continued on lasix and aldactone.  She was also started on lactulose, cipro, and flagyll.  Pain controlled with morphine.  Course in the ICU was stable and patient cleared for discharge with follow up in 1 week with Dr. Delgado.

## 2020-01-08 NOTE — DISCHARGE NOTE PROVIDER - NSDCCPCAREPLAN_GEN_ALL_CORE_FT
PRINCIPAL DISCHARGE DIAGNOSIS  Diagnosis: Ascites due to alcoholic cirrhosis  Assessment and Plan of Treatment: You came into the hospital for an elective TIPS procedure for refractory ascites in your abdomen.  The procedure went well and you were monitored in the intensive care unit for 24 hours after the procedure.  You remained stable and were cleared for discharge.  Please follow up with Dr. Delgado, the hepatologist, in 1 week from today.  Please continue your home medications (lasix, spironolactone).  We also started you on a course of antibiotics (cipro and flagyl) which you should complete in total.  We also started a medication called lactulose to help treat complications of liver cirrhosis.

## 2020-01-08 NOTE — DISCHARGE NOTE NURSING/CASE MANAGEMENT/SOCIAL WORK - NSDCPEWEB_GEN_ALL_CORE
[facility] for Tobacco Control website --- [email or web address] website --- [email or web address]

## 2020-01-08 NOTE — DISCHARGE NOTE PROVIDER - PROVIDER TOKENS
PROVIDER:[TOKEN:[[id number]],FOLLOWUP:[2 weeks]],FREE:[LAST:[See],FIRST:[Yiachos],PHONE:[(   )    -],FAX:[(   )    -],FOLLOWUP:[1 week]],PROVIDER:[TOKEN:[[id number]],FOLLOWUP:[Routine]]

## 2020-01-08 NOTE — PROGRESS NOTE ADULT - ASSESSMENT
IMPRESSION:    Alcoholic hepatitis, refractory ascitics s/p TIPS   Recurrent pleural effusion s/p thora   Cirrhosis   HO COPD     PLAN:    CNS: No sedatives     HEENT: Oral care    PULMONARY:  HOB @ 45 degrees, cont home inhalers     CARDIOVASCULAR: MAP > 65     GI: GI prophylaxis.  Feeding, Lactulose prn     RENAL:  Follow up lytes.  Correct as needed    INFECTIOUS DISEASE: Follow up cultures, no abx     HEMATOLOGICAL:  DVT prophylaxis.    ENDOCRINE:  Follow up FS.  Insulin if needed     MUSCULOSKELETAL: ambulate as tolerated     Peripheral lines   Full code   DC home

## 2020-01-08 NOTE — PROGRESS NOTE ADULT - SUBJECTIVE AND OBJECTIVE BOX
Interventional Radiology Follow- Up Note      64y Female s/p TIPS and paracentesis on 1/7  in Interventional Radiology        O/N:     No complaints offered.    Vitals: T(F): 97.7 (01-08-20 @ 08:00), Max: 98.4 (01-07-20 @ 18:00)  HR: 108 (01-08-20 @ 08:00) (92 - 112)  BP: 83/46 (01-08-20 @ 08:00) (75/46 - 114/59)  RR: 15 (01-08-20 @ 08:00) (10 - 31)  SpO2: 91% (01-08-20 @ 08:00) (90% - 100%)  Wt(kg): --    LABS:                        14.1   10.79 )-----------( 86       ( 08 Jan 2020 05:08 )             40.7     01-08    137  |  101  |  10  ----------------------------<  125<H>  3.7   |  23  |  0.6<L>    Ca    8.3<L>      08 Jan 2020 05:08  Phos  3.1     01-08  Mg     1.4     01-08    TPro  5.2<L>  /  Alb  2.7<L>  /  TBili  3.1<H>  /  DBili  x   /  AST  110<H>  /  ALT  47<H>  /  AlkPhos  132<H>  01-08        Culture:   output :        Impression: 64y Female s/p TIPS and paracentesis on 1/7     Plan:    - patient seen this AM   - having minor discomfort  - no reported BM overnight  - wean down IV pain med to PO if tolerable  - monitor for toleration of oral intake   - start lactulose 30mg 2 times daily; adjust dose every 1 to 2 days to produce 2 to 3 soft stools/day  - will need to be discharged on cipro/flagyl 500mg po bid for 5 days  - call IR for any ?'s Interventional Radiology Follow- Up Note      64y Female s/p TIPS and paracentesis on 1/7  in Interventional Radiology        O/N:     No complaints offered.    Vitals: T(F): 97.7 (01-08-20 @ 08:00), Max: 98.4 (01-07-20 @ 18:00)  HR: 108 (01-08-20 @ 08:00) (92 - 112)  BP: 83/46 (01-08-20 @ 08:00) (75/46 - 114/59)  RR: 15 (01-08-20 @ 08:00) (10 - 31)  SpO2: 91% (01-08-20 @ 08:00) (90% - 100%)  Wt(kg): --    LABS:                        14.1   10.79 )-----------( 86       ( 08 Jan 2020 05:08 )             40.7     01-08    137  |  101  |  10  ----------------------------<  125<H>  3.7   |  23  |  0.6<L>    Ca    8.3<L>      08 Jan 2020 05:08  Phos  3.1     01-08  Mg     1.4     01-08    TPro  5.2<L>  /  Alb  2.7<L>  /  TBili  3.1<H>  /  DBili  x   /  AST  110<H>  /  ALT  47<H>  /  AlkPhos  132<H>  01-08        Culture:   output :        Impression: 64y Female s/p TIPS and paracentesis on 1/7     Plan:    - patient seen this AM   - having minor discomfort  - no reported BM overnight  - wean down IV pain med to PO if tolerable  - monitor for toleration of oral intake   - start lactulose 30mg 2 times daily; adjust dose every 1 to 2 days to produce 2 to 3 soft stools/day  - will need to be discharged on cipro/flagyl 500mg po bid for 5 days  - will need to follow up GI Dr. Delgado in one week  - call IR for any ?'s

## 2020-01-08 NOTE — DISCHARGE NOTE PROVIDER - CARE PROVIDER_API CALL
Marga Ochoa)  Internal Medicine  [address] [place] [address]  Phone: [phone]  Fax: [phone]  Follow Up Time: 2 weeks    Danny Cui  Phone: (   )    -  Fax: (   )    -  Follow Up Time: 1 week    Elda Torres)  Radiology  [address] [place] [id number]  Phone: [phone]  Fax: [phone]  Follow Up Time: Routine

## 2020-01-09 LAB
HCV AB S/CO SERPL IA: 0.19 S/CO — SIGNIFICANT CHANGE UP (ref 0–0.99)
HCV AB SERPL-IMP: SIGNIFICANT CHANGE UP

## 2020-01-14 DIAGNOSIS — Z88.1 ALLERGY STATUS TO OTHER ANTIBIOTIC AGENTS STATUS: ICD-10-CM

## 2020-01-14 DIAGNOSIS — E11.9 TYPE 2 DIABETES MELLITUS WITHOUT COMPLICATIONS: ICD-10-CM

## 2020-01-14 DIAGNOSIS — I10 ESSENTIAL (PRIMARY) HYPERTENSION: ICD-10-CM

## 2020-01-14 DIAGNOSIS — Z79.84 LONG TERM (CURRENT) USE OF ORAL HYPOGLYCEMIC DRUGS: ICD-10-CM

## 2020-01-14 DIAGNOSIS — F41.9 ANXIETY DISORDER, UNSPECIFIED: ICD-10-CM

## 2020-01-14 DIAGNOSIS — K70.31 ALCOHOLIC CIRRHOSIS OF LIVER WITH ASCITES: ICD-10-CM

## 2020-01-14 DIAGNOSIS — J44.9 CHRONIC OBSTRUCTIVE PULMONARY DISEASE, UNSPECIFIED: ICD-10-CM

## 2020-01-14 DIAGNOSIS — E78.5 HYPERLIPIDEMIA, UNSPECIFIED: ICD-10-CM

## 2020-01-16 ENCOUNTER — OUTPATIENT (OUTPATIENT)
Dept: OUTPATIENT SERVICES | Facility: HOSPITAL | Age: 65
LOS: 1 days | Discharge: HOME | End: 2020-01-16
Payer: COMMERCIAL

## 2020-01-16 DIAGNOSIS — Z98.49 CATARACT EXTRACTION STATUS, UNSPECIFIED EYE: Chronic | ICD-10-CM

## 2020-01-16 DIAGNOSIS — Z98.890 OTHER SPECIFIED POSTPROCEDURAL STATES: Chronic | ICD-10-CM

## 2020-01-16 PROCEDURE — 93970 EXTREMITY STUDY: CPT | Mod: 26

## 2020-01-16 PROCEDURE — 71046 X-RAY EXAM CHEST 2 VIEWS: CPT | Mod: 26

## 2020-01-20 DIAGNOSIS — M79.89 OTHER SPECIFIED SOFT TISSUE DISORDERS: ICD-10-CM

## 2020-01-24 ENCOUNTER — APPOINTMENT (OUTPATIENT)
Dept: INTERVENTIONAL RADIOLOGY/VASCULAR | Facility: CLINIC | Age: 65
End: 2020-01-24
Payer: COMMERCIAL

## 2020-01-24 ENCOUNTER — OUTPATIENT (OUTPATIENT)
Dept: OUTPATIENT SERVICES | Facility: HOSPITAL | Age: 65
LOS: 1 days | Discharge: HOME | End: 2020-01-24
Payer: COMMERCIAL

## 2020-01-24 DIAGNOSIS — Z98.49 CATARACT EXTRACTION STATUS, UNSPECIFIED EYE: Chronic | ICD-10-CM

## 2020-01-24 DIAGNOSIS — K70.31 ALCOHOLIC CIRRHOSIS OF LIVER WITH ASCITES: ICD-10-CM

## 2020-01-24 DIAGNOSIS — Z98.890 OTHER SPECIFIED POSTPROCEDURAL STATES: Chronic | ICD-10-CM

## 2020-01-24 DIAGNOSIS — F17.200 NICOTINE DEPENDENCE, UNSPECIFIED, UNCOMPLICATED: ICD-10-CM

## 2020-01-24 PROCEDURE — 99215 OFFICE O/P EST HI 40 MIN: CPT

## 2020-01-24 PROCEDURE — 49083 ABD PARACENTESIS W/IMAGING: CPT

## 2020-01-24 PROCEDURE — 32555 ASPIRATE PLEURA W/ IMAGING: CPT | Mod: LT

## 2020-01-24 PROCEDURE — 76705 ECHO EXAM OF ABDOMEN: CPT | Mod: 26

## 2020-01-24 RX ORDER — MULTIVIT-MIN/FOLIC/VIT K/LYCOP 400-300MCG
1000 TABLET ORAL
Refills: 0 | Status: ACTIVE | COMMUNITY
Start: 2020-01-24

## 2020-01-24 RX ORDER — OMEGA-3/DHA/EPA/FISH OIL 300-1000MG
400 CAPSULE ORAL
Refills: 0 | Status: ACTIVE | COMMUNITY
Start: 2020-01-24

## 2020-01-24 RX ORDER — RIFAXIMIN 550 MG/1
550 TABLET ORAL
Refills: 0 | Status: ACTIVE | COMMUNITY
Start: 2020-01-24

## 2020-01-24 RX ORDER — SPIRONOLACTONE 25 MG/1
25 TABLET ORAL
Refills: 0 | Status: ACTIVE | COMMUNITY
Start: 2020-01-24

## 2020-01-24 RX ORDER — ATORVASTATIN CALCIUM 20 MG/1
20 TABLET, FILM COATED ORAL
Refills: 0 | Status: ACTIVE | COMMUNITY
Start: 2020-01-24

## 2020-01-24 RX ORDER — CHOLECALCIFEROL (VITAMIN D3) 50 MCG
50 MCG TABLET ORAL
Refills: 0 | Status: ACTIVE | COMMUNITY
Start: 2020-01-24

## 2020-01-24 RX ORDER — CLONAZEPAM 0.5 MG/1
0.5 TABLET ORAL
Refills: 0 | Status: ACTIVE | COMMUNITY
Start: 2020-01-24

## 2020-01-24 RX ORDER — PAROXETINE HYDROCHLORIDE 20 MG/1
20 TABLET, FILM COATED ORAL
Refills: 0 | Status: ACTIVE | COMMUNITY
Start: 2020-01-24

## 2020-01-24 RX ORDER — FLUTICASONE FUROATE AND VILANTEROL TRIFENATATE 200; 25 UG/1; UG/1
200-25 POWDER RESPIRATORY (INHALATION)
Refills: 0 | Status: ACTIVE | COMMUNITY
Start: 2020-01-24

## 2020-01-24 RX ORDER — FUROSEMIDE 20 MG/1
20 TABLET ORAL
Refills: 0 | Status: ACTIVE | COMMUNITY
Start: 2020-01-24

## 2020-01-24 RX ORDER — METFORMIN HYDROCHLORIDE 500 MG/1
500 TABLET, COATED ORAL
Refills: 0 | Status: ACTIVE | COMMUNITY
Start: 2020-01-24

## 2020-01-24 NOTE — REVIEW OF SYSTEMS
[Feeling Poorly] : feeling poorly [Shortness Of Breath] : no shortness of breath [Feeling Tired] : feeling tired [Cough] : cough [Feelings Of Weakness] : feelings of weakness [Incontinence] : incontinence [Limb Swelling] : limb swelling [Negative] : Cardiovascular [FreeTextEntry7] : Distended

## 2020-01-24 NOTE — PROGRESS NOTE ADULT - SUBJECTIVE AND OBJECTIVE BOX
Interventional Radiology Brief- Operative Note: Paracentesis    Procedure: right sided image guided paracentesis and left thoracentesis     Pre-Op Diagnosis: cirrhosis with recurrent ascites, recurrent left pleural effusion    Post-Op Diagnosis: same     Provider: Joanne Davenport PA-C    Anesthesia (type):  [ ] General Anesthesia  [ ] Sedation  [ ] Spinal Anesthesia  [ x ] Local/Regional    Contrast: none    Estimated Blood Loss: <5mL    Condition:   [ ] Critical  [ ] Serious  [ ] Fair   [ x ] Good    Findings/Follow up Plan of Care: 3.45L of serous fluid removed from abdomen, 25% albumin in 50cc solution administered x1, 100cc of serous fluid removed from left lung    Specimens Removed: none requested    Implants: none    Complications: none    Condition/Disposition: monitor vitals Q15 x 2, d/c home    Discharge instructions: resume diet and activity as tolerated, resume medications as needed, keep dressing clean and dry - remove after 2 days, follow up paracentesis as needed    Please call Interventional Radiology [phone] with any questions, concerns, or issues.

## 2020-01-24 NOTE — ASSESSMENT
[FreeTextEntry1] : 64 year old female with cirrhosis, patient known to IR for frequent paracentesis and left thoras. Patient is status post TIPS on 1/7. Since procedure, patient has had less frequent deni and thoras however new b/l Leg swelling\par \par - U/S recently was negative for DVT\par - swelling likely due to redistribution of ascites/fluid\par - plan for ultrasound of abdomen/TIPS and left chest today\par - if fluid in abdomen and chest will proceed with paracentesis and left thoracentesis \par - patient needs to f/u with GI/ Hepatology to ensure for hepatic encephelopathy prophylaxis

## 2020-01-24 NOTE — PHYSICAL EXAM
[Alert] : alert [Pedal Pulses Normal] : the pedal pulses are present [No Acute Distress] : no acute distress [Fully active, able to carry on all pre-disease performance without restriction] : Fully active, able to carry on all pre-disease performance without restriction [Oriented x3] : oriented to person, place, and time [de-identified] : No neck swelling

## 2020-01-24 NOTE — PROGRESS NOTE ADULT - SUBJECTIVE AND OBJECTIVE BOX
PREOPERATIVE DAY OF PROCEDURE EVALUATION:     I have personally seen and examined this patient. I agree with the history and physical which I have reviewed and noted any changes below:     Plan is for image guided paracentesis and left thoracentesis today 1/24    Procedure/ risks/ benefits/ goals/ alternatives were explained. All questions answered. Informed content obtained from patient. Consent placed in chart.

## 2020-01-24 NOTE — HISTORY OF PRESENT ILLNESS
[FreeTextEntry1] : 64 year old female s/p TIPS on 1/7 presents with worsening leg swelling. Leg swelling has been new since the procedure. Patient also complaining of cough however this has been occurring for the last 9 months. New also since the procedure, patient c/o tired, unsteadiness, and confusion. Since the procedure, patient has seen Dr. Christianson (sp?) from hepatology at [place]. Dr. Christianson started patient on rifaximin 500 mg TID.

## 2020-02-07 ENCOUNTER — OUTPATIENT (OUTPATIENT)
Dept: OUTPATIENT SERVICES | Facility: HOSPITAL | Age: 65
LOS: 1 days | Discharge: HOME | End: 2020-02-07
Payer: COMMERCIAL

## 2020-02-07 DIAGNOSIS — Z98.49 CATARACT EXTRACTION STATUS, UNSPECIFIED EYE: Chronic | ICD-10-CM

## 2020-02-07 DIAGNOSIS — Z98.890 OTHER SPECIFIED POSTPROCEDURAL STATES: Chronic | ICD-10-CM

## 2020-02-07 PROCEDURE — 71045 X-RAY EXAM CHEST 1 VIEW: CPT | Mod: 26

## 2020-02-07 PROCEDURE — 49083 ABD PARACENTESIS W/IMAGING: CPT

## 2020-02-07 PROCEDURE — 32555 ASPIRATE PLEURA W/ IMAGING: CPT | Mod: LT

## 2020-02-07 PROCEDURE — 76705 ECHO EXAM OF ABDOMEN: CPT | Mod: 26

## 2020-02-07 NOTE — PROGRESS NOTE ADULT - SUBJECTIVE AND OBJECTIVE BOX
PREOPERATIVE DAY OF PROCEDURE EVALUATION:     I have personally seen and examined this patient. I agree with the history and physical which I have reviewed and noted any changes below:     Plan is for image guided paracentesis with albumin and possible thoracentesis today 2/7    Procedure/ risks/ benefits/ goals/ alternatives were explained. All questions answered. Informed content obtained from patient. Consent placed in chart.

## 2020-02-07 NOTE — PROGRESS NOTE ADULT - SUBJECTIVE AND OBJECTIVE BOX
Interventional Radiology Brief- Operative Note: Paracentesis    Procedure: left sided image guided paracentesis with albumin    Pre-Op Diagnosis: cirrhosis with recurrent ascites     Post-Op Diagnosis: same     Provider: Joanne Davenport PA-C    Anesthesia (type):  [ ] General Anesthesia  [ ] Sedation  [ ] Spinal Anesthesia  [ x ] Local/Regional    Contrast: none    Estimated Blood Loss: <5mL    Condition:   [ ] Critical  [ ] Serious  [ ] Fair   [ x ] Good    Findings/Follow up Plan of Care: 4.1L of serous fluid removed, 25% albumin in 50cc solution administered x 1    Specimens Removed: none requested by ordering physician    Implants: none     Complications: none     Condition/Disposition: monitor vitals Q10 x 2, d/c home     Discharge instructions: resume diet and activity as tolerated, resume medications as needed, keep dressing clean and dry - remove after 2 days, follow up paracentesis as needed    Please call Interventional Radiology [phone] with any questions, concerns, or issues. Interventional Radiology Brief- Operative Note: Paracentesis    Procedure: left sided image guided paracentesis with albumin and left sided thoracentesis    Pre-Op Diagnosis: cirrhosis with recurrent ascites     Post-Op Diagnosis: same     Provider: Joanne Davenport PA-C    Anesthesia (type):  [ ] General Anesthesia  [ ] Sedation  [ ] Spinal Anesthesia  [ x ] Local/Regional    Contrast: none    Estimated Blood Loss: <5mL    Condition:   [ ] Critical  [ ] Serious  [ ] Fair   [ x ] Good    Findings/Follow up Plan of Care: 4.1L of serous peritoneal fluid removed, 25% albumin in 50cc solution administered x 1, 1.6L of serosanginous fluid removed from L lung    Specimens Removed: none requested by ordering physician    Implants: none     Complications: none     Condition/Disposition: monitor vitals Q10 x 2, CXR shows no PTX, d/c home     Discharge instructions: resume diet and activity as tolerated, resume medications as needed, keep dressing clean and dry - remove after 2 days, follow up paracentesis as needed    Please call Interventional Radiology [phone] with any questions, concerns, or issues.

## 2020-02-10 DIAGNOSIS — K74.60 UNSPECIFIED CIRRHOSIS OF LIVER: ICD-10-CM

## 2020-02-10 DIAGNOSIS — R18.8 OTHER ASCITES: ICD-10-CM

## 2020-02-10 DIAGNOSIS — J90 PLEURAL EFFUSION, NOT ELSEWHERE CLASSIFIED: ICD-10-CM

## 2020-02-10 DIAGNOSIS — Z79.84 LONG TERM (CURRENT) USE OF ORAL HYPOGLYCEMIC DRUGS: ICD-10-CM

## 2020-02-10 DIAGNOSIS — F41.9 ANXIETY DISORDER, UNSPECIFIED: ICD-10-CM

## 2020-02-10 DIAGNOSIS — E78.5 HYPERLIPIDEMIA, UNSPECIFIED: ICD-10-CM

## 2020-02-10 DIAGNOSIS — I10 ESSENTIAL (PRIMARY) HYPERTENSION: ICD-10-CM

## 2020-02-10 DIAGNOSIS — E05.90 THYROTOXICOSIS, UNSPECIFIED WITHOUT THYROTOXIC CRISIS OR STORM: ICD-10-CM

## 2020-02-10 DIAGNOSIS — J44.9 CHRONIC OBSTRUCTIVE PULMONARY DISEASE, UNSPECIFIED: ICD-10-CM

## 2020-02-10 DIAGNOSIS — E11.9 TYPE 2 DIABETES MELLITUS WITHOUT COMPLICATIONS: ICD-10-CM

## 2020-02-10 DIAGNOSIS — Z88.0 ALLERGY STATUS TO PENICILLIN: ICD-10-CM

## 2020-02-12 DIAGNOSIS — K74.60 UNSPECIFIED CIRRHOSIS OF LIVER: ICD-10-CM

## 2020-02-12 DIAGNOSIS — Z96.89 PRESENCE OF OTHER SPECIFIED FUNCTIONAL IMPLANTS: ICD-10-CM

## 2020-02-12 DIAGNOSIS — E78.5 HYPERLIPIDEMIA, UNSPECIFIED: ICD-10-CM

## 2020-02-12 DIAGNOSIS — J44.9 CHRONIC OBSTRUCTIVE PULMONARY DISEASE, UNSPECIFIED: ICD-10-CM

## 2020-02-12 DIAGNOSIS — Z82.49 FAMILY HISTORY OF ISCHEMIC HEART DISEASE AND OTHER DISEASES OF THE CIRCULATORY SYSTEM: ICD-10-CM

## 2020-02-12 DIAGNOSIS — F17.210 NICOTINE DEPENDENCE, CIGARETTES, UNCOMPLICATED: ICD-10-CM

## 2020-02-12 DIAGNOSIS — E03.9 HYPOTHYROIDISM, UNSPECIFIED: ICD-10-CM

## 2020-02-12 DIAGNOSIS — I10 ESSENTIAL (PRIMARY) HYPERTENSION: ICD-10-CM

## 2020-02-20 ENCOUNTER — OUTPATIENT (OUTPATIENT)
Dept: OUTPATIENT SERVICES | Facility: HOSPITAL | Age: 65
LOS: 1 days | Discharge: HOME | End: 2020-02-20
Payer: COMMERCIAL

## 2020-02-20 DIAGNOSIS — Z98.890 OTHER SPECIFIED POSTPROCEDURAL STATES: Chronic | ICD-10-CM

## 2020-02-20 DIAGNOSIS — Z98.49 CATARACT EXTRACTION STATUS, UNSPECIFIED EYE: Chronic | ICD-10-CM

## 2020-02-20 PROCEDURE — 32555 ASPIRATE PLEURA W/ IMAGING: CPT | Mod: LT

## 2020-02-20 PROCEDURE — 71045 X-RAY EXAM CHEST 1 VIEW: CPT | Mod: 26

## 2020-02-20 PROCEDURE — 49083 ABD PARACENTESIS W/IMAGING: CPT

## 2020-02-20 NOTE — PROGRESS NOTE ADULT - SUBJECTIVE AND OBJECTIVE BOX
Interventional Radiology Brief- Operative Note: Paracentesis    Procedure: left sided image guided paracentesis with albumin and left sided image guided thoracentesis     Pre-Op Diagnosis: cirrhosis with recurrent ascites     Post-Op Diagnosis: same     Provider: Joanne Davenport PA-C    Anesthesia (type):  [ ] General Anesthesia  [ ] Sedation  [ ] Spinal Anesthesia  [ x ] Local/Regional    Contrast: none    Estimated Blood Loss: <5mL    Condition:   [ ] Critical  [ ] Serious  [ ] Fair   [ x ] Good    Findings/Follow up Plan of Care: 4.3L of serous fluid removed, 25% albumin in 50cc solution x 1, 1.6L of serosanguinous fluid removed from left lung    Specimens Removed: none requested     Implants: none     Complications: none     Condition/Disposition: monitor vitals Q15 x  2, post CXR, d/c home     Discharge instructions: resume diet and activity as tolerated, resume medications as needed, keep dressing clean and dry - remove after 2 days, follow up paracentesis as needed    Please call Interventional Radiology [phone] with any questions, concerns, or issues.

## 2020-02-20 NOTE — PROGRESS NOTE ADULT - SUBJECTIVE AND OBJECTIVE BOX
PREOPERATIVE DAY OF PROCEDURE EVALUATION:     I have personally seen and examined this patient. I agree with the history and physical which I have reviewed and noted any changes below:     Plan is for image guided paracentesis and possible thoracentesis today 2/20    Procedure/ risks/ benefits/ goals/ alternatives were explained. All questions answered. Informed content obtained from patient. Consent placed in chart.

## 2020-02-27 DIAGNOSIS — F41.9 ANXIETY DISORDER, UNSPECIFIED: ICD-10-CM

## 2020-02-27 DIAGNOSIS — I10 ESSENTIAL (PRIMARY) HYPERTENSION: ICD-10-CM

## 2020-02-27 DIAGNOSIS — Z79.84 LONG TERM (CURRENT) USE OF ORAL HYPOGLYCEMIC DRUGS: ICD-10-CM

## 2020-02-27 DIAGNOSIS — E05.90 THYROTOXICOSIS, UNSPECIFIED WITHOUT THYROTOXIC CRISIS OR STORM: ICD-10-CM

## 2020-02-27 DIAGNOSIS — E78.5 HYPERLIPIDEMIA, UNSPECIFIED: ICD-10-CM

## 2020-02-27 DIAGNOSIS — R18.8 OTHER ASCITES: ICD-10-CM

## 2020-02-27 DIAGNOSIS — J44.9 CHRONIC OBSTRUCTIVE PULMONARY DISEASE, UNSPECIFIED: ICD-10-CM

## 2020-02-27 DIAGNOSIS — E11.9 TYPE 2 DIABETES MELLITUS WITHOUT COMPLICATIONS: ICD-10-CM

## 2020-02-27 DIAGNOSIS — K74.60 UNSPECIFIED CIRRHOSIS OF LIVER: ICD-10-CM

## 2020-03-04 ENCOUNTER — OUTPATIENT (OUTPATIENT)
Dept: OUTPATIENT SERVICES | Facility: HOSPITAL | Age: 65
LOS: 1 days | Discharge: HOME | End: 2020-03-04
Payer: COMMERCIAL

## 2020-03-04 VITALS — WEIGHT: 179.9 LBS | HEIGHT: 65 IN

## 2020-03-04 DIAGNOSIS — Z98.890 OTHER SPECIFIED POSTPROCEDURAL STATES: Chronic | ICD-10-CM

## 2020-03-04 DIAGNOSIS — Z98.49 CATARACT EXTRACTION STATUS, UNSPECIFIED EYE: Chronic | ICD-10-CM

## 2020-03-04 LAB
ALBUMIN SERPL ELPH-MCNC: 2.6 G/DL — LOW (ref 3.5–5.2)
ALP SERPL-CCNC: 231 U/L — HIGH (ref 30–115)
ALT FLD-CCNC: 21 U/L — SIGNIFICANT CHANGE UP (ref 0–41)
ANION GAP SERPL CALC-SCNC: 13 MMOL/L — SIGNIFICANT CHANGE UP (ref 7–14)
APTT BLD: 36.9 SEC — SIGNIFICANT CHANGE UP (ref 27–39.2)
AST SERPL-CCNC: 67 U/L — HIGH (ref 0–41)
BILIRUB DIRECT SERPL-MCNC: 1.8 MG/DL — HIGH (ref 0–0.2)
BILIRUB INDIRECT FLD-MCNC: 2.4 MG/DL — HIGH (ref 0.2–1.2)
BILIRUB SERPL-MCNC: 4.2 MG/DL — HIGH (ref 0.2–1.2)
BUN SERPL-MCNC: 8 MG/DL — LOW (ref 10–20)
CALCIUM SERPL-MCNC: 8.7 MG/DL — SIGNIFICANT CHANGE UP (ref 8.5–10.1)
CHLORIDE SERPL-SCNC: 101 MMOL/L — SIGNIFICANT CHANGE UP (ref 98–110)
CO2 SERPL-SCNC: 25 MMOL/L — SIGNIFICANT CHANGE UP (ref 17–32)
CREAT SERPL-MCNC: 0.7 MG/DL — SIGNIFICANT CHANGE UP (ref 0.7–1.5)
GLUCOSE SERPL-MCNC: 106 MG/DL — HIGH (ref 70–99)
HCT VFR BLD CALC: 46 % — SIGNIFICANT CHANGE UP (ref 37–47)
HGB BLD-MCNC: 16.3 G/DL — HIGH (ref 12–16)
INR BLD: 1.54 RATIO — HIGH (ref 0.65–1.3)
LDH SERPL L TO P-CCNC: 361 — HIGH (ref 50–242)
MCHC RBC-ENTMCNC: 35.4 G/DL — SIGNIFICANT CHANGE UP (ref 32–37)
MCHC RBC-ENTMCNC: 35.9 PG — HIGH (ref 27–31)
MCV RBC AUTO: 101.3 FL — HIGH (ref 81–99)
NRBC # BLD: 0 /100 WBCS — SIGNIFICANT CHANGE UP (ref 0–0)
PLATELET # BLD AUTO: 112 K/UL — LOW (ref 130–400)
POTASSIUM SERPL-MCNC: 3.6 MMOL/L — SIGNIFICANT CHANGE UP (ref 3.5–5)
POTASSIUM SERPL-SCNC: 3.6 MMOL/L — SIGNIFICANT CHANGE UP (ref 3.5–5)
PROT SERPL-MCNC: 7.3 G/DL — SIGNIFICANT CHANGE UP (ref 6–8)
PROTHROM AB SERPL-ACNC: 17.7 SEC — HIGH (ref 9.95–12.87)
RBC # BLD: 4.54 M/UL — SIGNIFICANT CHANGE UP (ref 4.2–5.4)
RBC # FLD: 16.2 % — HIGH (ref 11.5–14.5)
SODIUM SERPL-SCNC: 139 MMOL/L — SIGNIFICANT CHANGE UP (ref 135–146)
WBC # BLD: 12.18 K/UL — HIGH (ref 4.8–10.8)
WBC # FLD AUTO: 12.18 K/UL — HIGH (ref 4.8–10.8)

## 2020-03-04 PROCEDURE — 37183 REVISION TIPS: CPT

## 2020-03-04 PROCEDURE — 32555 ASPIRATE PLEURA W/ IMAGING: CPT | Mod: LT

## 2020-03-04 PROCEDURE — 76937 US GUIDE VASCULAR ACCESS: CPT | Mod: 26

## 2020-03-04 PROCEDURE — 49083 ABD PARACENTESIS W/IMAGING: CPT

## 2020-03-04 NOTE — PROGRESS NOTE ADULT - SUBJECTIVE AND OBJECTIVE BOX
INTERVENTIONAL RADIOLOGY BRIEF-OPERATIVE NOTE    Procedure:TIPS revision, paracentesis, thoracentesis- left    Pre-Op Diagnosis: Recurrent ascites    Post-Op Diagnosis: same    Attending: Melissa  Resident: none    Anesthesia (type):  [ ] General Anesthesia  [x ] Sedation per anesthesia  [ ] Spinal Anesthesia  [ x] Local/Regional    Contrast: 100 cc    Estimated Blood Loss: Minimal, < 5 cc    Condition:   [ ] Critical  [ ] Serious  [ ] Fair   [ c] Good    Findings/Follow up Plan of Care:  see full report in pacs    Specimens Removed: none    Implants: none    Complications: none     Disposition: Recovery       Please call Interventional Radiology [phone] with any questions, concerns, or issues.

## 2020-03-04 NOTE — PRE-ANESTHESIA EVALUATION ADULT - NSANTHOSAYNRD_GEN_A_CORE
never tested, see screening tool/No. LETICIA screening performed.  STOP BANG Legend: 0-2 = LOW Risk; 3-4 = INTERMEDIATE Risk; 5-8 = HIGH Risk

## 2020-03-04 NOTE — PROGRESS NOTE ADULT - SUBJECTIVE AND OBJECTIVE BOX
PREOPERATIVE DAY OF PROCEDURE EVALUATION:     I have personally seen and examined this patient. I agree with the history and physical which I have reviewed and noted any changes below:     Plan is for tips revision, paracentesis, left thoracentesis with anesthesia     Procedure/ risks/ benefits/ goals/ alternatives were explained. All questions answered. Informed content obtained from patient. Consent placed in chart.

## 2020-03-05 ENCOUNTER — OUTPATIENT (OUTPATIENT)
Dept: OUTPATIENT SERVICES | Facility: HOSPITAL | Age: 65
LOS: 1 days | Discharge: HOME | End: 2020-03-05
Payer: COMMERCIAL

## 2020-03-05 DIAGNOSIS — Z98.49 CATARACT EXTRACTION STATUS, UNSPECIFIED EYE: Chronic | ICD-10-CM

## 2020-03-05 DIAGNOSIS — Z98.890 OTHER SPECIFIED POSTPROCEDURAL STATES: Chronic | ICD-10-CM

## 2020-03-05 DIAGNOSIS — Z01.810 ENCOUNTER FOR PREPROCEDURAL CARDIOVASCULAR EXAMINATION: ICD-10-CM

## 2020-03-05 LAB — GLUCOSE BLDC GLUCOMTR-MCNC: 96 MG/DL — SIGNIFICANT CHANGE UP (ref 70–99)

## 2020-03-05 PROCEDURE — 93306 TTE W/DOPPLER COMPLETE: CPT | Mod: 26

## 2020-03-09 ENCOUNTER — APPOINTMENT (OUTPATIENT)
Dept: HEPATOLOGY | Facility: CLINIC | Age: 65
End: 2020-03-09

## 2020-03-09 LAB
ALKALINE PHOSPHATASE INTERPRETATION: SIGNIFICANT CHANGE UP
ALP BONE SERPL-MCNC: 34 % — SIGNIFICANT CHANGE UP (ref 28–66)
ALP FLD-CCNC: 209 U/L — HIGH (ref 37–153)
ALP INTEST CFR SERPL: 21 % — SIGNIFICANT CHANGE UP (ref 1–24)
ALP LIVER SERPL-CCNC: 45 % — SIGNIFICANT CHANGE UP (ref 25–69)
ALP MACRO CFR SERPL: 0 % — SIGNIFICANT CHANGE UP
ALP PLAC SERPL-CCNC: 0 % — SIGNIFICANT CHANGE UP

## 2020-03-11 ENCOUNTER — OUTPATIENT (OUTPATIENT)
Dept: OUTPATIENT SERVICES | Facility: HOSPITAL | Age: 65
LOS: 1 days | Discharge: HOME | End: 2020-03-11
Payer: COMMERCIAL

## 2020-03-11 DIAGNOSIS — Z98.890 OTHER SPECIFIED POSTPROCEDURAL STATES: Chronic | ICD-10-CM

## 2020-03-11 DIAGNOSIS — Z98.49 CATARACT EXTRACTION STATUS, UNSPECIFIED EYE: Chronic | ICD-10-CM

## 2020-03-11 LAB — GLUCOSE BLDC GLUCOMTR-MCNC: 108 MG/DL — HIGH (ref 70–99)

## 2020-03-11 PROCEDURE — 32555 ASPIRATE PLEURA W/ IMAGING: CPT | Mod: LT

## 2020-03-11 PROCEDURE — 49083 ABD PARACENTESIS W/IMAGING: CPT

## 2020-03-11 PROCEDURE — 71045 X-RAY EXAM CHEST 1 VIEW: CPT | Mod: 26

## 2020-03-11 NOTE — PROGRESS NOTE ADULT - SUBJECTIVE AND OBJECTIVE BOX
Interventional Radiology Brief- Operative Note: Paracentesis    Procedure: Right sided image guided paracentesis with albumin    Pre-Op Diagnosis: Cirrhosis    Post-Op Diagnosis: Cirrhosis    Provider: Leonora Noriega MD, Joanne Davenport PA-C    Anesthesia (type):  [ ] General Anesthesia  [ ] Sedation  [ ] Spinal Anesthesia  [ x ] Local/Regional    Contrast: none    Estimated Blood Loss: <5mL    Condition:   [ ] Critical  [ ] Serious  [ ] Fair   [x] Good    Findings/Follow up Plan of Care: 4.3L of serous fluid removed, 25% albumin in 50 cc solution administered x1    Specimens Removed: None requested    Implants: None    Complications: None    Condition/Disposition: monitor vitals Q10 min x2, discharge home    Discharge instructions: resume diet and activity as tolerated, resume medications as needed, keep dressing clean and dry - remove after 2 days, follow up paracentesis as needed    Please call Interventional Radiology [phone] with any questions, concerns, or issues. Interventional Radiology Brief- Operative Note: Paracentesis and Thoracentesis    Procedure: Right sided image guided paracentesis/thoracentesis with albumin    Pre-Op Diagnosis: Cirrhosis    Post-Op Diagnosis: Cirrhosis    Provider: Leonora Noriega MD, Joanne Davenport PA-C    Anesthesia (type):  [ ] General Anesthesia  [ ] Sedation  [ ] Spinal Anesthesia  [ x ] Local/Regional    Contrast: none    Estimated Blood Loss: <5mL    Condition:   [ ] Critical  [ ] Serious  [ ] Fair   [x] Good    Findings/Follow up Plan of Care:   Paracentesis: 4.3L of serous fluid removed, 25% albumin in 50 cc solution administered x1  Thoracentesis: 1.5L of serous fluid removed, no albumin given    Specimens Removed: None requested    Implants: None    Complications: None    Condition/Disposition: monitor vitals Q10 min x2, discharge home    Discharge instructions: resume diet and activity as tolerated, resume medications as needed, keep dressing clean and dry - remove after 2 days, follow up paracentesis and/or thoracentesis as needed    Please call Interventional Radiology [phone] with any questions, concerns, or issues.

## 2020-03-11 NOTE — PROGRESS NOTE ADULT - SUBJECTIVE AND OBJECTIVE BOX
PREOPERATIVE DAY OF PROCEDURE EVALUATION:     I have personally seen and examined this patient. I agree with the history and physical which I have reviewed and noted any changes below:     Plan is for image guided paracentesis / possible thoracentesis today 3/11    Procedure/ risks/ benefits/ goals/ alternatives were explained. All questions answered. Informed content obtained from patient. Consent placed in chart.

## 2020-03-13 DIAGNOSIS — K74.60 UNSPECIFIED CIRRHOSIS OF LIVER: ICD-10-CM

## 2020-03-13 DIAGNOSIS — E11.9 TYPE 2 DIABETES MELLITUS WITHOUT COMPLICATIONS: ICD-10-CM

## 2020-03-13 DIAGNOSIS — I10 ESSENTIAL (PRIMARY) HYPERTENSION: ICD-10-CM

## 2020-03-13 DIAGNOSIS — R18.8 OTHER ASCITES: ICD-10-CM

## 2020-03-13 DIAGNOSIS — J44.9 CHRONIC OBSTRUCTIVE PULMONARY DISEASE, UNSPECIFIED: ICD-10-CM

## 2020-03-13 DIAGNOSIS — E78.5 HYPERLIPIDEMIA, UNSPECIFIED: ICD-10-CM

## 2020-03-13 DIAGNOSIS — J90 PLEURAL EFFUSION, NOT ELSEWHERE CLASSIFIED: ICD-10-CM

## 2020-03-18 ENCOUNTER — RESULT REVIEW (OUTPATIENT)
Age: 65
End: 2020-03-18

## 2020-03-18 ENCOUNTER — OUTPATIENT (OUTPATIENT)
Dept: OUTPATIENT SERVICES | Facility: HOSPITAL | Age: 65
LOS: 1 days | Discharge: HOME | End: 2020-03-18
Payer: COMMERCIAL

## 2020-03-18 DIAGNOSIS — J44.9 CHRONIC OBSTRUCTIVE PULMONARY DISEASE, UNSPECIFIED: ICD-10-CM

## 2020-03-18 DIAGNOSIS — Z98.49 CATARACT EXTRACTION STATUS, UNSPECIFIED EYE: Chronic | ICD-10-CM

## 2020-03-18 DIAGNOSIS — K70.31 ALCOHOLIC CIRRHOSIS OF LIVER WITH ASCITES: ICD-10-CM

## 2020-03-18 DIAGNOSIS — I10 ESSENTIAL (PRIMARY) HYPERTENSION: ICD-10-CM

## 2020-03-18 DIAGNOSIS — Z98.890 OTHER SPECIFIED POSTPROCEDURAL STATES: Chronic | ICD-10-CM

## 2020-03-18 DIAGNOSIS — E78.5 HYPERLIPIDEMIA, UNSPECIFIED: ICD-10-CM

## 2020-03-18 DIAGNOSIS — Z79.84 LONG TERM (CURRENT) USE OF ORAL HYPOGLYCEMIC DRUGS: ICD-10-CM

## 2020-03-18 DIAGNOSIS — E03.9 HYPOTHYROIDISM, UNSPECIFIED: ICD-10-CM

## 2020-03-18 DIAGNOSIS — Z79.02 LONG TERM (CURRENT) USE OF ANTITHROMBOTICS/ANTIPLATELETS: ICD-10-CM

## 2020-03-18 DIAGNOSIS — E11.9 TYPE 2 DIABETES MELLITUS WITHOUT COMPLICATIONS: ICD-10-CM

## 2020-03-18 DIAGNOSIS — F41.9 ANXIETY DISORDER, UNSPECIFIED: ICD-10-CM

## 2020-03-18 DIAGNOSIS — Z88.0 ALLERGY STATUS TO PENICILLIN: ICD-10-CM

## 2020-03-18 PROCEDURE — 49083 ABD PARACENTESIS W/IMAGING: CPT

## 2020-03-18 PROCEDURE — 71045 X-RAY EXAM CHEST 1 VIEW: CPT | Mod: 26

## 2020-03-18 PROCEDURE — 32555 ASPIRATE PLEURA W/ IMAGING: CPT | Mod: LT

## 2020-03-18 NOTE — PROGRESS NOTE ADULT - SUBJECTIVE AND OBJECTIVE BOX
Interventional Radiology Brief- Operative Note: Paracentesis    Procedure: right sided image guided paracentesis     Pre-Op Diagnosis: cirrhosis with recurrent ascites     Post-Op Diagnosis: same     Provider: Joanne Davenport PA-C    Anesthesia (type):  [ ] General Anesthesia  [ ] Sedation  [ ] Spinal Anesthesia  [ x ] Local/Regional    Contrast: none    Estimated Blood Loss: <5mL    Condition:   [ ] Critical  [ ] Serious  [ ] Fair   [ x ] Good    Findings/Follow up Plan of Care: 5.7L of serous fluid removed     Specimens Removed: none requested    Implants: none     Complications: none     Condition/Disposition: d/c home    Discharge instructions: resume diet and activity as tolerated, resume medications as needed, keep dressing clean and dry - remove after 2 days, follow up paracentesis as needed    Please call Interventional Radiology [phone] with any questions, concerns, or issues. Interventional Radiology Brief- Operative Note: Paracentesis    Procedure: right sided image guided paracentesis     Pre-Op Diagnosis: cirrhosis with recurrent ascites     Post-Op Diagnosis: same     Provider: Joanne Davenport PA-C    Anesthesia (type):  [ ] General Anesthesia  [ ] Sedation  [ ] Spinal Anesthesia  [ x ] Local/Regional    Contrast: none    Estimated Blood Loss: <5mL    Condition:   [ ] Critical  [ ] Serious  [ ] Fair   [ x ] Good    Findings/Follow up Plan of Care: 5.7L of serous fluid removed, 25% albumin in 50cc solution administered x 1    Specimens Removed: none requested    Implants: none     Complications: none     Condition/Disposition: d/c home    Discharge instructions: resume diet and activity as tolerated, resume medications as needed, keep dressing clean and dry - remove after 2 days, follow up paracentesis as needed    Please call Interventional Radiology [phone] with any questions, concerns, or issues. Interventional Radiology Brief- Operative Note: Paracentesis    Procedure: right sided image guided paracentesis / left thoracentesis    Pre-Op Diagnosis: cirrhosis with recurrent ascites / recurrent pleural effusion    Post-Op Diagnosis: same     Provider: Joanne Davenport PA-C    Anesthesia (type):  [ ] General Anesthesia  [ ] Sedation  [ ] Spinal Anesthesia  [ x ] Local/Regional    Contrast: none    Estimated Blood Loss: <5mL    Condition:   [ ] Critical  [ ] Serious  [ ] Fair   [ x ] Good    Findings/Follow up Plan of Care: 5.7L of serous fluid removed, 25% albumin in 50cc solution administered x 1, 650cc serosanguinous fluid removed from L lung    Specimens Removed: none requested    Implants: none     Complications: none     Condition/Disposition: CXR post thoracentesis, d/c home     Discharge instructions: resume diet and activity as tolerated, resume medications as needed, keep dressing clean and dry - remove after 2 days, follow up paracentesis/thoracentesis as needed    Please call Interventional Radiology [phone] with any questions, concerns, or issues.

## 2020-03-18 NOTE — PROGRESS NOTE ADULT - SUBJECTIVE AND OBJECTIVE BOX
PREOPERATIVE DAY OF PROCEDURE EVALUATION:     I have personally seen and examined this patient. I agree with the history and physical which I have reviewed and noted any changes below:     Plan is for image guided paracentesis and possible thoracentesis today 3/18    Procedure/ risks/ benefits/ goals/ alternatives were explained. All questions answered. Informed content obtained from patient. Consent placed in chart.

## 2020-03-23 DIAGNOSIS — Z88.0 ALLERGY STATUS TO PENICILLIN: ICD-10-CM

## 2020-03-23 DIAGNOSIS — E03.9 HYPOTHYROIDISM, UNSPECIFIED: ICD-10-CM

## 2020-03-23 DIAGNOSIS — J90 PLEURAL EFFUSION, NOT ELSEWHERE CLASSIFIED: ICD-10-CM

## 2020-03-23 DIAGNOSIS — E11.9 TYPE 2 DIABETES MELLITUS WITHOUT COMPLICATIONS: ICD-10-CM

## 2020-03-23 DIAGNOSIS — I10 ESSENTIAL (PRIMARY) HYPERTENSION: ICD-10-CM

## 2020-03-23 DIAGNOSIS — F41.9 ANXIETY DISORDER, UNSPECIFIED: ICD-10-CM

## 2020-03-23 DIAGNOSIS — Z79.84 LONG TERM (CURRENT) USE OF ORAL HYPOGLYCEMIC DRUGS: ICD-10-CM

## 2020-03-23 DIAGNOSIS — K70.31 ALCOHOLIC CIRRHOSIS OF LIVER WITH ASCITES: ICD-10-CM

## 2020-03-23 DIAGNOSIS — J44.9 CHRONIC OBSTRUCTIVE PULMONARY DISEASE, UNSPECIFIED: ICD-10-CM

## 2020-03-23 DIAGNOSIS — E78.5 HYPERLIPIDEMIA, UNSPECIFIED: ICD-10-CM

## 2020-03-23 DIAGNOSIS — F17.210 NICOTINE DEPENDENCE, CIGARETTES, UNCOMPLICATED: ICD-10-CM

## 2020-03-25 ENCOUNTER — OUTPATIENT (OUTPATIENT)
Dept: OUTPATIENT SERVICES | Facility: HOSPITAL | Age: 65
LOS: 1 days | Discharge: HOME | End: 2020-03-25
Payer: COMMERCIAL

## 2020-03-25 ENCOUNTER — RESULT REVIEW (OUTPATIENT)
Age: 65
End: 2020-03-25

## 2020-03-25 DIAGNOSIS — Z98.49 CATARACT EXTRACTION STATUS, UNSPECIFIED EYE: Chronic | ICD-10-CM

## 2020-03-25 DIAGNOSIS — Z98.890 OTHER SPECIFIED POSTPROCEDURAL STATES: Chronic | ICD-10-CM

## 2020-03-25 PROCEDURE — 71045 X-RAY EXAM CHEST 1 VIEW: CPT | Mod: 26

## 2020-03-25 PROCEDURE — 32555 ASPIRATE PLEURA W/ IMAGING: CPT | Mod: LT

## 2020-03-25 PROCEDURE — 49083 ABD PARACENTESIS W/IMAGING: CPT

## 2020-03-25 NOTE — PROCEDURE NOTE - NSPROCDETAILS_GEN_ALL_CORE
location identified, draped/prepped, sterile technique used, needle inserted/introduced
location identified, sterile technique used, catheter introduced, fluid drained/sterile dressing applied

## 2020-03-25 NOTE — PROGRESS NOTE ADULT - SUBJECTIVE AND OBJECTIVE BOX
PREOPERATIVE DAY OF PROCEDURE EVALUATION:     I have personally seen and examined this patient. I agree with the history and physical which I have reviewed and noted any changes below:     Plan is for image-directed therapeutic paracentesis and left thoracentesis with intravenous albumin administration.    Procedure/ risks/ benefits/ goals/ alternatives were explained. All questions answered. Informed content obtained from patient. Consent placed in chart.

## 2020-03-27 DIAGNOSIS — K74.60 UNSPECIFIED CIRRHOSIS OF LIVER: ICD-10-CM

## 2020-03-27 DIAGNOSIS — E78.00 PURE HYPERCHOLESTEROLEMIA, UNSPECIFIED: ICD-10-CM

## 2020-03-27 DIAGNOSIS — F41.9 ANXIETY DISORDER, UNSPECIFIED: ICD-10-CM

## 2020-03-27 DIAGNOSIS — J90 PLEURAL EFFUSION, NOT ELSEWHERE CLASSIFIED: ICD-10-CM

## 2020-03-27 DIAGNOSIS — E11.9 TYPE 2 DIABETES MELLITUS WITHOUT COMPLICATIONS: ICD-10-CM

## 2020-03-27 DIAGNOSIS — R18.8 OTHER ASCITES: ICD-10-CM

## 2020-03-27 DIAGNOSIS — I10 ESSENTIAL (PRIMARY) HYPERTENSION: ICD-10-CM

## 2020-03-27 DIAGNOSIS — E03.9 HYPOTHYROIDISM, UNSPECIFIED: ICD-10-CM

## 2020-03-27 DIAGNOSIS — Z79.84 LONG TERM (CURRENT) USE OF ORAL HYPOGLYCEMIC DRUGS: ICD-10-CM

## 2020-03-27 DIAGNOSIS — Z88.0 ALLERGY STATUS TO PENICILLIN: ICD-10-CM

## 2020-03-27 DIAGNOSIS — J44.9 CHRONIC OBSTRUCTIVE PULMONARY DISEASE, UNSPECIFIED: ICD-10-CM

## 2020-04-01 ENCOUNTER — RESULT REVIEW (OUTPATIENT)
Age: 65
End: 2020-04-01

## 2020-04-01 ENCOUNTER — OUTPATIENT (OUTPATIENT)
Dept: OUTPATIENT SERVICES | Facility: HOSPITAL | Age: 65
LOS: 1 days | Discharge: HOME | End: 2020-04-01
Payer: COMMERCIAL

## 2020-04-01 DIAGNOSIS — Z98.890 OTHER SPECIFIED POSTPROCEDURAL STATES: Chronic | ICD-10-CM

## 2020-04-01 DIAGNOSIS — Z98.49 CATARACT EXTRACTION STATUS, UNSPECIFIED EYE: Chronic | ICD-10-CM

## 2020-04-01 PROCEDURE — 49083 ABD PARACENTESIS W/IMAGING: CPT

## 2020-04-01 PROCEDURE — 32555 ASPIRATE PLEURA W/ IMAGING: CPT | Mod: LT

## 2020-04-01 NOTE — PROGRESS NOTE ADULT - SUBJECTIVE AND OBJECTIVE BOX
INTERVENTIONAL RADIOLOGY BRIEF-OPERATIVE NOTE    Procedure: Right-sided paracentesis, left-side thoracentesis	    Pre-Op Diagnosis: Ascites, pleural effusion	    Post-Op Diagnosis: Same    Attending: Dr. Elda Torres  Resident: Dr. Arthur Stiles    Anesthesia (type):  [ ] General Anesthesia  [ ] Sedation  [ ] Spinal Anesthesia  [x] Local/Regional    Contrast: None    Estimated Blood Loss: Minimal, < 5 cc    Condition:   [ ] Critical  [ ] Serious  [ ] Fair   [x Good    Findings/Follow up Plan of Care:  Status post ultrasound-guided right-sided paracentesis with removal of 5.4L of straw-colored fluid.  Status post ultrasound-guided left-sided thoracentesis with removal of 550 cc of straw-colored fluid.    Specimens Removed: None.    Implants: None.    Complications: None immediate.    Disposition: Discharge home following short interval monitoring in recovery area.      Please call Interventional Radiology [phone] with any questions, concerns, or issues.

## 2020-04-03 DIAGNOSIS — K74.60 UNSPECIFIED CIRRHOSIS OF LIVER: ICD-10-CM

## 2020-04-03 DIAGNOSIS — Z79.84 LONG TERM (CURRENT) USE OF ORAL HYPOGLYCEMIC DRUGS: ICD-10-CM

## 2020-04-03 DIAGNOSIS — J44.9 CHRONIC OBSTRUCTIVE PULMONARY DISEASE, UNSPECIFIED: ICD-10-CM

## 2020-04-03 DIAGNOSIS — E11.9 TYPE 2 DIABETES MELLITUS WITHOUT COMPLICATIONS: ICD-10-CM

## 2020-04-03 DIAGNOSIS — J90 PLEURAL EFFUSION, NOT ELSEWHERE CLASSIFIED: ICD-10-CM

## 2020-04-03 DIAGNOSIS — I10 ESSENTIAL (PRIMARY) HYPERTENSION: ICD-10-CM

## 2020-04-03 DIAGNOSIS — R18.8 OTHER ASCITES: ICD-10-CM

## 2020-04-08 ENCOUNTER — OUTPATIENT (OUTPATIENT)
Dept: OUTPATIENT SERVICES | Facility: HOSPITAL | Age: 65
LOS: 1 days | Discharge: HOME | End: 2020-04-08
Payer: COMMERCIAL

## 2020-04-08 ENCOUNTER — RESULT REVIEW (OUTPATIENT)
Age: 65
End: 2020-04-08

## 2020-04-08 DIAGNOSIS — Z98.890 OTHER SPECIFIED POSTPROCEDURAL STATES: Chronic | ICD-10-CM

## 2020-04-08 DIAGNOSIS — Z98.49 CATARACT EXTRACTION STATUS, UNSPECIFIED EYE: Chronic | ICD-10-CM

## 2020-04-08 PROCEDURE — 49083 ABD PARACENTESIS W/IMAGING: CPT

## 2020-04-08 NOTE — PROGRESS NOTE ADULT - SUBJECTIVE AND OBJECTIVE BOX
Interventional Radiology Brief- Operative Note: Paracentesis    Procedure: right sided image guided paracentesis with albumin    Pre-Op Diagnosis: cirrhosis with recurrent ascites     Post-Op Diagnosis:  same     Provider: Joanne Davenport PA-C    Anesthesia (type):  [ ] General Anesthesia  [ ] Sedation  [ ] Spinal Anesthesia  [ x ] Local/Regional    Contrast: none    Estimated Blood Loss: <5mL    Condition:   [ ] Critical  [ ] Serious  [ ] Fair   [ x ] Good    Findings/Follow up Plan of Care: 4.65L of serous fluid removed     Specimens Removed: none requested     Implants: none     Complications: none     Condition/Disposition: monitor vitals Q10 x 2, d/c home    Discharge instructions: resume diet and activity as tolerated, resume medications as needed, keep dressing clean and dry - remove after 2 days, follow up paracentesis as needed    Please call Interventional Radiology [phone] with any questions, concerns, or issues.

## 2020-04-08 NOTE — PROGRESS NOTE ADULT - SUBJECTIVE AND OBJECTIVE BOX
PREOPERATIVE DAY OF PROCEDURE EVALUATION:     I have personally seen and examined this patient. I agree with the history and physical which I have reviewed and noted any changes below:     Plan is for image guided paracentesis with possible thoracentesis today 4/8    Procedure/ risks/ benefits/ goals/ alternatives were explained. All questions answered. Informed content obtained from patient. Consent placed in chart.

## 2020-04-10 DIAGNOSIS — R18.8 OTHER ASCITES: ICD-10-CM

## 2020-04-10 DIAGNOSIS — K74.60 UNSPECIFIED CIRRHOSIS OF LIVER: ICD-10-CM

## 2020-04-15 ENCOUNTER — OUTPATIENT (OUTPATIENT)
Dept: OUTPATIENT SERVICES | Facility: HOSPITAL | Age: 65
LOS: 1 days | Discharge: HOME | End: 2020-04-15
Payer: COMMERCIAL

## 2020-04-15 DIAGNOSIS — Z98.890 OTHER SPECIFIED POSTPROCEDURAL STATES: Chronic | ICD-10-CM

## 2020-04-15 DIAGNOSIS — Z98.49 CATARACT EXTRACTION STATUS, UNSPECIFIED EYE: Chronic | ICD-10-CM

## 2020-04-15 PROCEDURE — 49083 ABD PARACENTESIS W/IMAGING: CPT

## 2020-04-15 NOTE — PROGRESS NOTE ADULT - SUBJECTIVE AND OBJECTIVE BOX
PREOPERATIVE DAY OF PROCEDURE EVALUATION:     I have personally seen and examined this patient. I agree with the history and physical which I have reviewed and noted any changes below:     Plan is for image guided paracentesis with possible thoracentesis today 4/15    Procedure/ risks/ benefits/ goals/ alternatives were explained. All questions answered. Informed content obtained from patient. Consent placed in chart.

## 2020-04-15 NOTE — PROGRESS NOTE ADULT - SUBJECTIVE AND OBJECTIVE BOX
Interventional Radiology Brief- Operative Note: Paracentesis    Procedure: right sided image guided paracentesis with albumin    Pre-Op Diagnosis: cirrhosis with recurrent ascites     Post-Op Diagnosis: same     Provider: Joanne Davenport PA-C    Anesthesia (type):  [ ] General Anesthesia  [ ] Sedation  [ ] Spinal Anesthesia  [ x ] Local/Regional    Contrast: none    Estimated Blood Loss: <5mL    Condition:   [ ] Critical  [ ] Serious  [ ] Fair   [ x ] Good    Findings/Follow up Plan of Care: 4.6	    Specimens Removed: 4.6L of serous fluid removed, 25% albumin in 50cc solution administered x 1, not enough fluid for thoracentesis     Implants: none     Complications: none     Condition/Disposition: monitor vitals Q 10 x 2, d/c home     Discharge instructions: resume diet and activity as tolerated, resume medications as needed, keep dressing clean and dry - remove after 2 days, follow up paracentesis as needed    Please call Interventional Radiology [phone] with any questions, concerns, or issues.

## 2020-04-17 DIAGNOSIS — K74.60 UNSPECIFIED CIRRHOSIS OF LIVER: ICD-10-CM

## 2020-04-17 DIAGNOSIS — R18.8 OTHER ASCITES: ICD-10-CM

## 2020-04-24 ENCOUNTER — OUTPATIENT (OUTPATIENT)
Dept: OUTPATIENT SERVICES | Facility: HOSPITAL | Age: 65
LOS: 1 days | Discharge: HOME | End: 2020-04-24
Payer: COMMERCIAL

## 2020-04-24 DIAGNOSIS — Z98.49 CATARACT EXTRACTION STATUS, UNSPECIFIED EYE: Chronic | ICD-10-CM

## 2020-04-24 DIAGNOSIS — Z98.890 OTHER SPECIFIED POSTPROCEDURAL STATES: Chronic | ICD-10-CM

## 2020-04-24 PROCEDURE — 49083 ABD PARACENTESIS W/IMAGING: CPT

## 2020-04-24 NOTE — PROGRESS NOTE ADULT - SUBJECTIVE AND OBJECTIVE BOX
Interventional Radiology Brief- Operative Note: Paracentesis    Procedure: right sided image guided paracentesis     Pre-Op Diagnosis: cirrhosis with recurrent ascites     Post-Op Diagnosis: same     Provider: Joanne Davenport PA-C    Anesthesia (type):  [ ] General Anesthesia  [ ] Sedation  [ ] Spinal Anesthesia  [ x ] Local/Regional    Contrast: none    Estimated Blood Loss: <5mL    Condition:   [ ] Critical  [ ] Serious  [ ] Fair   [ x ] Good    Findings/Follow up Plan of Care: 3.8L of serous fluid removed, 25% albumin in 50cc solution administered x 1, no fluid noted for thoracentesis     Specimens Removed: none requested     Implants: none     Complications: none     Condition/Disposition: monitor vitals Q10 x 2, d/c home     Discharge instructions: resume diet and activity as tolerated, resume medications as needed, keep dressing clean and dry - remove after 2 days, follow up paracentesis as needed    Please call Interventional Radiology [phone] with any questions, concerns, or issues.

## 2020-04-24 NOTE — PROGRESS NOTE ADULT - SUBJECTIVE AND OBJECTIVE BOX
PREOPERATIVE DAY OF PROCEDURE EVALUATION:     I have personally seen and examined this patient. I agree with the history and physical which I have reviewed and noted any changes below:     Plan is for image guided paracentesis with albumin, possible thoracentesis     Procedure/ risks/ benefits/ goals/ alternatives were explained. All questions answered. Informed content obtained from patient. Consent placed in chart.

## 2020-04-27 ENCOUNTER — APPOINTMENT (OUTPATIENT)
Dept: HEPATOLOGY | Facility: CLINIC | Age: 65
End: 2020-04-27

## 2020-04-27 DIAGNOSIS — K74.60 UNSPECIFIED CIRRHOSIS OF LIVER: ICD-10-CM

## 2020-04-27 DIAGNOSIS — R18.8 OTHER ASCITES: ICD-10-CM

## 2020-05-02 ENCOUNTER — OUTPATIENT (OUTPATIENT)
Dept: OUTPATIENT SERVICES | Facility: HOSPITAL | Age: 65
LOS: 1 days | Discharge: HOME | End: 2020-05-02

## 2020-05-02 DIAGNOSIS — Z98.49 CATARACT EXTRACTION STATUS, UNSPECIFIED EYE: Chronic | ICD-10-CM

## 2020-05-02 DIAGNOSIS — Z98.890 OTHER SPECIFIED POSTPROCEDURAL STATES: Chronic | ICD-10-CM

## 2020-05-02 DIAGNOSIS — Z11.59 ENCOUNTER FOR SCREENING FOR OTHER VIRAL DISEASES: ICD-10-CM

## 2020-05-02 LAB — SARS-COV-2 RNA SPEC QL NAA+PROBE: SIGNIFICANT CHANGE UP

## 2020-05-03 NOTE — CHART NOTE - NSCHARTNOTEFT_GEN_A_CORE
Patient is s/p nasopharyngeal Covid PCR yesterday.   Result was negative.  Patient was notified via phone of negative result.

## 2020-05-04 ENCOUNTER — OUTPATIENT (OUTPATIENT)
Dept: OUTPATIENT SERVICES | Facility: HOSPITAL | Age: 65
LOS: 1 days | Discharge: HOME | End: 2020-05-04
Payer: COMMERCIAL

## 2020-05-04 ENCOUNTER — RESULT REVIEW (OUTPATIENT)
Age: 65
End: 2020-05-04

## 2020-05-04 DIAGNOSIS — Z98.49 CATARACT EXTRACTION STATUS, UNSPECIFIED EYE: Chronic | ICD-10-CM

## 2020-05-04 DIAGNOSIS — Z98.890 OTHER SPECIFIED POSTPROCEDURAL STATES: Chronic | ICD-10-CM

## 2020-05-04 PROCEDURE — 71045 X-RAY EXAM CHEST 1 VIEW: CPT | Mod: 26

## 2020-05-04 PROCEDURE — 32555 ASPIRATE PLEURA W/ IMAGING: CPT | Mod: LT

## 2020-05-04 PROCEDURE — 49083 ABD PARACENTESIS W/IMAGING: CPT

## 2020-05-04 NOTE — PROGRESS NOTE ADULT - SUBJECTIVE AND OBJECTIVE BOX
Interventional Radiology Brief- Operative Note: Paracentesis    Procedure: right sided image guided paracentesis / left sided thoracentesis     Pre-Op Diagnosis: cirrhosis with recurrent ascites    Post-Op Diagnosis: same    Provider: Joanne Davenport PA-C    Anesthesia (type):  [ ] General Anesthesia  [ ] Sedation  [ ] Spinal Anesthesia  [ x ] Local/Regional    Contrast: none    Estimated Blood Loss: <5mL    Condition:   [ ] Critical  [ ] Serious  [ ] Fair   [ x ] Good    Findings/Follow up Plan of Care: 3.8L of serous peritoneal fluid removed, 25% albumin in 50cc solution administered x 1, 150cc serosanguinous pleural fluid removed     Specimens Removed: none requested - labs drawn as requested by [facility] liver specialist    Implants: none     Complications: none     Condition/Disposition: CXR, monitor vitals Q10 x 2, d/c home    Discharge instructions: resume diet and activity as tolerated, resume medications as needed, keep dressing clean and dry - remove after 2 days, follow up paracentesis as needed    Please call Interventional Radiology [phone] with any questions, concerns, or issues.

## 2020-05-04 NOTE — PROGRESS NOTE ADULT - SUBJECTIVE AND OBJECTIVE BOX
PREOPERATIVE DAY OF PROCEDURE EVALUATION:     I have personally seen and examined this patient. I agree with the history and physical which I have reviewed and noted any changes below:     Plan is for image guided paracentesis/possible thoracentesis today 5/4    Procedure/ risks/ benefits/ goals/ alternatives were explained. All questions answered. Informed content obtained from patient. Consent placed in chart.

## 2020-05-19 DIAGNOSIS — E11.9 TYPE 2 DIABETES MELLITUS WITHOUT COMPLICATIONS: ICD-10-CM

## 2020-05-19 DIAGNOSIS — J44.9 CHRONIC OBSTRUCTIVE PULMONARY DISEASE, UNSPECIFIED: ICD-10-CM

## 2020-05-19 DIAGNOSIS — K74.60 UNSPECIFIED CIRRHOSIS OF LIVER: ICD-10-CM

## 2020-05-19 DIAGNOSIS — Z79.84 LONG TERM (CURRENT) USE OF ORAL HYPOGLYCEMIC DRUGS: ICD-10-CM

## 2020-05-19 DIAGNOSIS — R18.8 OTHER ASCITES: ICD-10-CM

## 2020-05-19 DIAGNOSIS — F41.9 ANXIETY DISORDER, UNSPECIFIED: ICD-10-CM

## 2020-05-19 DIAGNOSIS — Z88.1 ALLERGY STATUS TO OTHER ANTIBIOTIC AGENTS STATUS: ICD-10-CM

## 2020-05-19 DIAGNOSIS — I10 ESSENTIAL (PRIMARY) HYPERTENSION: ICD-10-CM

## 2020-05-19 DIAGNOSIS — J90 PLEURAL EFFUSION, NOT ELSEWHERE CLASSIFIED: ICD-10-CM

## 2020-06-02 ENCOUNTER — APPOINTMENT (OUTPATIENT)
Dept: GASTROENTEROLOGY | Facility: CLINIC | Age: 65
End: 2020-06-02

## 2020-07-17 NOTE — ED BEHAVIORAL HEALTH ASSESSMENT NOTE - NS ED BHA PLAN TR BH CONTACTED FT
Brandon Amos  NEUROSURGERY  [address]  [place] [address]  Phone: [phone]  Fax: [phone]  Follow Up Time:     Brissa Pham  INTERNAL MEDICINE  [address] [address]  Phone: [phone]  Fax: [phone]  Follow Up Time:
not available

## 2020-09-10 NOTE — ED ADULT NURSE NOTE - PMH
No pertinent past medical history COPD (chronic obstructive pulmonary disease)    DM (diabetes mellitus)    Gall stones    HLD (hyperlipidemia)    HTN (hypertension) <<----- Click to add NO pertinent Past Medical History

## 2021-04-09 NOTE — ED BEHAVIORAL HEALTH ASSESSMENT NOTE - ACCOMPANIED BY
Dominic Coppola) Speech therapist called and requested a call back in regards to patient needing home health services.  She can be reached at [phone] Family member

## 2021-06-22 NOTE — DISCHARGE NOTE ADULT - VISION (WITH CORRECTIVE LENSES IF THE PATIENT USUALLY WEARS THEM):
Normal vision: sees adequately in most situations; can see medication labels, newsprint No. LARA screening performed.  STOP BANG Legend: 0-2 = LOW Risk; 3-4 = INTERMEDIATE Risk; 5-8 = HIGH Risk

## 2022-05-20 NOTE — H&P PST ADULT - HEIGHT IN CM
165.1

## 2023-01-06 NOTE — PATIENT PROFILE ADULT - NSTOBACCOQUITATTEMPT_GEN_A_CORE_SD

## 2023-01-16 NOTE — H&P ADULT - ASSESSMENT
Subjective   Patient ID: Raffi is a 27 year old female.  Referring Physician: Ruperto Sterling MD    No chief complaint on file.    HPI    27 year old female who presents to discuss \"food sensitivity with GI symptoms.\"          I personally reviewed the record from patient's visit with Dr. Andrade 10/26/22 which is summarized as follows:  He was having intractable emesis that was sometimes food contents and sometimes bilious.  He has more emesis during times of extreme anxiety.  No history of IBD or colon cancer.  She does have a self-reported bed relationship with food and is tracking her calories with an maria eugenia.  She would have episodes of binging as well.  No history of anorexia or bulimia.  She started on Protonix 20 mg daily, told to avoid spicy and fried foods and referred to GI.      I personally reviewed the most recent labs which are summarized as follows:  4/27/22 -normal CMP, normal CBC except for platelet count of 157.  IgE food allergy panel was negative.  H. pylori serology was negative.        Past Medical History:   Diagnosis Date   • No known problems      Past Surgical History:   Procedure Laterality Date   • No past surgeries       Current Outpatient Medications   Medication Sig Dispense Refill   • pantoprazole (PROTONIX) 20 MG tablet Take 1 tablet by mouth daily. 30 tablet 3     No current facility-administered medications for this visit.       ALLERGIES:  No Known Allergies    Family History   Problem Relation Age of Onset   • Hypertension Mother    • Patient is unaware of any medical problems Father    • Patient is unaware of any medical problems Sister    • Patient is unaware of any medical problems Brother    • Cancer, Kidney Maternal Grandmother    • Dementia/Alzheimers Paternal Grandmother    • Cancer, Breast Neg Hx    • Cancer, Colon Neg Hx    • Cancer, Ovarian Neg Hx        Social History     Tobacco Use   • Smoking status: Never   • Smokeless tobacco: Never   Vaping Use   • Vaping Use: never  used   Substance Use Topics   • Alcohol use: Yes     Comment: social   • Drug use: Yes     Types: Marijuana           ROS  Except as noted elsewhere in the note, the ROS is negative for Constitutional, HEENT, CV, Respiratory, GI, , Musculoskeletal, Neuro, Psychiatric, Heme/Lymph note, Allergy,Endocrine, and Skin.       Objective   LMP 11/16/2022   The patient is alert and oriented, in no acute distress.  HEENT: Sclera are nonicteric.  CARDIAC: RRR, normal S1, S2. No murmurs, rubs or gallops.  PULMONARY: Clear bilaterally to ascultation  ABDOMEN: Soft, non-tender, non-distended. Bowel sounds arepresent. No obvious hepatomegaly, splenomegaly, or masses. No hernias or ascites.  PERIANAL: Examination deferred.  GENITALS: Examination deferred.  LYMPATICS: No palpable nodes in the neck  MUSCULOSKELETAL: Normal station, digits and nails. Extremities are all normal without edema.  SKIN: Anicteric.      Assessment   {Assess/PlanSmartLinks:35863}      Suman Dinero MD  Gastroenterology  Specialist in Inflammatory Bowel Disease (Crohn's and Colitis)       64 years old female from home with alcoholic cirrhosis with refractory ascites (diagnosed 1 year ago, undergoes paracentesis every 2 weeks), COPD not on home O2, DM, HTN, DLD, anxiety, presented to the hospital for elective TIPS procedure for refractory ascites. Admitted for post TIPS monitoring.      # Alcoholic cirrhosis with refractory ascites  - s/p TIPS, paracentesis with 5L of fluid removed, and left thoracentesis with 1.5L removed  - Will obtain post procedural CXR, patient is satting well  - Will transfuse albumin due to large volume of fluid removed  - Continue Lasix 20mg q12hrs and Aldactone 25mg q24hrs  - If there is severe abdominal pain or hemodynamically instability, please obtain CT abdomen to rule out perforation  - Pain control with morphine, add bowel regimen  - Will obtain routine labs    # HTN  - BP stable  - Continue Lasix    # DLD  - Continue Lipitor 40mg qHS    # DM  - Hold oral DM meds  - Check FS, if persistently > 180 then start insulin basal/bolus    # COPD not on home O2  - No signs of exacerbation  - Continue Symbicort (in lieu of Breo), Spiriva, and Duoneb PRN    # Anxiety  - Continue Paxil and Klonopin      DVT ppx: SubQ heparin  GI ppx: Protonix  Diet: DASH diet  Activity: increase as tolerated  Lines: Peripheral IVs  Code status: full code  Dispo: MICU monitoring for 24 hours 64 years old female from home with alcoholic cirrhosis with refractory ascites (diagnosed 1 year ago, undergoes paracentesis every 2 weeks), COPD not on home O2, DM, HTN, DLD, anxiety, presented to the hospital for elective TIPS procedure for refractory ascites. Admitted for post TIPS monitoring.      # Alcoholic cirrhosis with refractory ascites  - s/p TIPS, paracentesis with 6.2L of fluid removed, and left thoracentesis with 1.6L removed  - Will obtain post procedural CXR, patient is satting well  - Will transfuse albumin due to large volume of fluid removed  - Continue Lasix 20mg q12hrs and Aldactone 25mg q24hrs  - If there is severe abdominal pain or hemodynamically instability, please obtain CT abdomen to rule out perforation  - Pain control with morphine, add bowel regimen  - Will obtain routine labs    # HTN  - BP stable  - Continue Lasix    # DLD  - Continue Lipitor 40mg qHS    # DM  - Hold oral DM meds  - Check FS, if persistently > 180 then start insulin basal/bolus    # COPD not on home O2  - No signs of exacerbation  - Continue Symbicort (in lieu of Breo), Spiriva, and Duoneb PRN    # Anxiety  - Continue Paxil and Klonopin      DVT ppx: SubQ heparin  GI ppx: Protonix  Diet: DASH diet  Activity: increase as tolerated  Lines: Peripheral IVs  Code status: full code  Dispo: MICU monitoring for 24 hours

## 2023-06-13 NOTE — ED BEHAVIORAL HEALTH ASSESSMENT NOTE - ESTIMATED INTELLIGENCE
[Chaperone Present] : A chaperone was present in the examining room during all aspects of the physical examination [Normal] : Anus and perineum: Normal sphincter tone, no masses, no prolapse. [Fully active, able to carry on all pre-disease performance without restriction] : Status 0 - Fully active, able to carry on all pre-disease performance without restriction [FreeTextEntry1] : Kia  [de-identified] : no lesions or nodules [de-identified] : well-healed, patent os, no gross lesions [de-identified] : small, mobile [de-identified] : adnexa nonpalpable [de-identified] : adnexa nonpalpable Average

## 2023-08-07 NOTE — ED ADULT TRIAGE NOTE - NS ED NURSE BANDS TYPE
Discharge Instructions for Cataract Surgery    USE DROPS AS INSTRUCTED:     PREDNISOLONE  ONE DROP 4 TIMES A DAY  Moxifloxacin ONE DROP 4 TIMES A DAY  KETOROLAC ONE DROP 4 TIMES A DAY   WAIT 2 MINUTES BETWEEN DROPS     BRING ALL EYE DROPS TO YOUR CLINIC VISITS    Wear sunglasses during the day  Do not sleep on the affected side and wear eye shield while sleeping for 2 weeks.  No exercise or lifting greater than 10 lbs for 2 weeks.  Try not to cough. If coughing a lot, take a cough suppressent  Do not bend over for 2 weeks.  Keep water it of your eye for 2 weeks.             Wear sunglasses for comfort as needed.  It is normal to feel a slight irritation, like there is an eyelash in the eye that had surgery.   You may take Tylenol for discomfort but if pain intensifies , call Dr     If you use eye drops for glaucoma you may continue to use them.      After Surgery:  Always be aware that any surgery can cause these symptoms:    Pain- Medication can be prescribed for pain to decrease your pain but may not completely take your pain away.  Over the Counter pain medicine my be enough and you can always use Ice and rest to help ease pain.    Bleeding- a little bleeding after a surgery is usually within normal.  If there is a lot of blood you need to notify your MD.  Emergency treatments of bleeding are cold application, elevation of the bleeding site and compression.    Infection- Infection after surgery is NOT a normal occurrence.  Signs of infection are fever, swelling, hot to touch the incision.  If this occurs notify your MD immediately.    Nausea- this can be common after a surgery especially if you have had anesthesia medicine or are taking pain medicine.  Staying on clear liquids, bland foods, gingerale, or over the counter anti nausea medicines can help.  If you vomit more than once, notify your MD.  Anti Nausea medicines can be prescribed.   
Name band;

## 2023-11-21 NOTE — ASU PREOP CHECKLIST - ISOLATION PRECAUTIONS
Problem: Pain  Goal: Verbalizes/displays adequate comfort level or baseline comfort level  Outcome: Completed     Problem: Safety - Adult  Goal: Free from fall injury  Outcome: Completed none

## 2024-07-04 NOTE — PATIENT PROFILE ADULT - NSPROREFERSVCHOMEDIABETES_GEN_A_NUR
Prep Survey      Flowsheet Row Responses   [facility] facility patient discharged from? [facility]   Is LACE score < 7 ? No   Eligibility Readm Mgmt   Discharge diagnosis CAD, CABG x 4 using rright leg vein harvest   Does the patient have one of the following disease processes/diagnoses(primary or secondary)? Cardiothoracic surgery   Does the patient have Home health ordered? No   Is there a DME ordered? No   Prep survey completed? Yes            Sujata MYRICK - Registered Nurse           no